# Patient Record
Sex: MALE | Race: WHITE | Employment: FULL TIME
[De-identification: names, ages, dates, MRNs, and addresses within clinical notes are randomized per-mention and may not be internally consistent; named-entity substitution may affect disease eponyms.]

---

## 2020-10-05 ENCOUNTER — NURSE TRIAGE (OUTPATIENT)
Dept: OTHER | Facility: CLINIC | Age: 65
End: 2020-10-05

## 2020-10-05 ENCOUNTER — HOSPITAL ENCOUNTER (INPATIENT)
Age: 65
LOS: 2 days | Discharge: ANOTHER HOSPITAL | DRG: 280 | End: 2020-10-07
Attending: EMERGENCY MEDICINE | Admitting: INTERNAL MEDICINE
Payer: MEDICARE

## 2020-10-05 ENCOUNTER — APPOINTMENT (OUTPATIENT)
Dept: GENERAL RADIOLOGY | Age: 65
DRG: 280 | End: 2020-10-05
Payer: MEDICARE

## 2020-10-05 ENCOUNTER — OFFICE VISIT (OUTPATIENT)
Dept: FAMILY MEDICINE CLINIC | Age: 65
End: 2020-10-05
Payer: MEDICARE

## 2020-10-05 VITALS
BODY MASS INDEX: 37.18 KG/M2 | OXYGEN SATURATION: 99 % | DIASTOLIC BLOOD PRESSURE: 140 MMHG | HEART RATE: 84 BPM | HEIGHT: 69 IN | SYSTOLIC BLOOD PRESSURE: 140 MMHG | WEIGHT: 251 LBS

## 2020-10-05 DIAGNOSIS — I48.91 ATRIAL FIBRILLATION WITH RVR (HCC): Primary | ICD-10-CM

## 2020-10-05 PROBLEM — R07.89 ATYPICAL CHEST PAIN: Status: ACTIVE | Noted: 2020-10-05

## 2020-10-05 PROBLEM — D49.4 NEOPLASM OF BLADDER: Status: ACTIVE | Noted: 2020-10-05

## 2020-10-05 PROBLEM — R00.2 PALPITATIONS: Status: ACTIVE | Noted: 2020-10-05

## 2020-10-05 PROBLEM — I49.9 IRREGULAR HEARTBEAT: Status: ACTIVE | Noted: 2020-10-05

## 2020-10-05 PROBLEM — E78.5 DYSLIPIDEMIA: Status: ACTIVE | Noted: 2020-10-05

## 2020-10-05 PROBLEM — F17.200 HAS BEEN SMOKING TOBACCO FOR 30 YEARS OR MORE: Status: ACTIVE | Noted: 2020-10-05

## 2020-10-05 PROBLEM — E29.1 TESTICULAR HYPOFUNCTION: Status: ACTIVE | Noted: 2020-10-05

## 2020-10-05 PROBLEM — I10 ESSENTIAL HYPERTENSION: Status: ACTIVE | Noted: 2020-10-05

## 2020-10-05 LAB
ABSOLUTE EOS #: 0.2 K/UL (ref 0–0.4)
ABSOLUTE IMMATURE GRANULOCYTE: ABNORMAL K/UL (ref 0–0.3)
ABSOLUTE LYMPH #: 1.3 K/UL (ref 1–4.8)
ABSOLUTE MONO #: 0.6 K/UL (ref 0.1–1.3)
ALBUMIN SERPL-MCNC: 4.1 G/DL (ref 3.5–5.2)
ALBUMIN/GLOBULIN RATIO: NORMAL (ref 1–2.5)
ALP BLD-CCNC: 125 U/L (ref 40–129)
ALT SERPL-CCNC: 30 U/L (ref 5–41)
ANION GAP SERPL CALCULATED.3IONS-SCNC: 12 MMOL/L (ref 9–17)
AST SERPL-CCNC: 37 U/L
BASOPHILS # BLD: 0 % (ref 0–2)
BASOPHILS ABSOLUTE: 0 K/UL (ref 0–0.2)
BILIRUB SERPL-MCNC: 0.85 MG/DL (ref 0.3–1.2)
BNP INTERPRETATION: ABNORMAL
BUN BLDV-MCNC: 19 MG/DL (ref 8–23)
BUN/CREAT BLD: NORMAL (ref 9–20)
C-REACTIVE PROTEIN: 1.3 MG/L (ref 0–5)
CALCIUM SERPL-MCNC: 9.2 MG/DL (ref 8.6–10.4)
CHLORIDE BLD-SCNC: 102 MMOL/L (ref 98–107)
CHOLESTEROL/HDL RATIO: 2.9
CHOLESTEROL: 194 MG/DL
CO2: 23 MMOL/L (ref 20–31)
CREAT SERPL-MCNC: 0.84 MG/DL (ref 0.7–1.2)
D-DIMER QUANTITATIVE: 0.5 MG/L FEU (ref 0–0.59)
DIFFERENTIAL TYPE: ABNORMAL
EOSINOPHILS RELATIVE PERCENT: 2 % (ref 0–4)
GFR AFRICAN AMERICAN: >60 ML/MIN
GFR NON-AFRICAN AMERICAN: >60 ML/MIN
GFR SERPL CREATININE-BSD FRML MDRD: NORMAL ML/MIN/{1.73_M2}
GFR SERPL CREATININE-BSD FRML MDRD: NORMAL ML/MIN/{1.73_M2}
GLUCOSE BLD-MCNC: 98 MG/DL (ref 70–99)
HCT VFR BLD CALC: 42.3 % (ref 41–53)
HDLC SERPL-MCNC: 68 MG/DL
HEMOGLOBIN: 14.5 G/DL (ref 13.5–17.5)
IMMATURE GRANULOCYTES: ABNORMAL %
INR BLD: 1.1
LDL CHOLESTEROL: 113 MG/DL (ref 0–130)
LYMPHOCYTES # BLD: 19 % (ref 24–44)
MAGNESIUM: 2.1 MG/DL (ref 1.6–2.6)
MCH RBC QN AUTO: 33.8 PG (ref 26–34)
MCHC RBC AUTO-ENTMCNC: 34.4 G/DL (ref 31–37)
MCV RBC AUTO: 98.3 FL (ref 80–100)
MONOCYTES # BLD: 9 % (ref 1–7)
NRBC AUTOMATED: ABNORMAL PER 100 WBC
PARTIAL THROMBOPLASTIN TIME: 29.9 SEC (ref 24–36)
PDW BLD-RTO: 13.5 % (ref 11.5–14.9)
PHOSPHORUS: 3.6 MG/DL (ref 2.5–4.5)
PLATELET # BLD: 163 K/UL (ref 150–450)
PLATELET ESTIMATE: ABNORMAL
PMV BLD AUTO: 8.4 FL (ref 6–12)
POTASSIUM SERPL-SCNC: 4.2 MMOL/L (ref 3.7–5.3)
PRO-BNP: 2712 PG/ML
PROTHROMBIN TIME: 13.9 SEC (ref 11.8–14.6)
RBC # BLD: 4.3 M/UL (ref 4.5–5.9)
RBC # BLD: ABNORMAL 10*6/UL
SEG NEUTROPHILS: 70 % (ref 36–66)
SEGMENTED NEUTROPHILS ABSOLUTE COUNT: 4.7 K/UL (ref 1.3–9.1)
SODIUM BLD-SCNC: 137 MMOL/L (ref 135–144)
TOTAL PROTEIN: 6.5 G/DL (ref 6.4–8.3)
TRIGL SERPL-MCNC: 64 MG/DL
TROPONIN INTERP: ABNORMAL
TROPONIN T: ABNORMAL NG/ML
TROPONIN, HIGH SENSITIVITY: 38 NG/L (ref 0–22)
TSH SERPL DL<=0.05 MIU/L-ACNC: 3.07 MIU/L (ref 0.3–5)
VLDLC SERPL CALC-MCNC: NORMAL MG/DL (ref 1–30)
WBC # BLD: 6.8 K/UL (ref 3.5–11)
WBC # BLD: ABNORMAL 10*3/UL

## 2020-10-05 PROCEDURE — 86140 C-REACTIVE PROTEIN: CPT

## 2020-10-05 PROCEDURE — 83735 ASSAY OF MAGNESIUM: CPT

## 2020-10-05 PROCEDURE — 3017F COLORECTAL CA SCREEN DOC REV: CPT | Performed by: FAMILY MEDICINE

## 2020-10-05 PROCEDURE — 1036F TOBACCO NON-USER: CPT | Performed by: FAMILY MEDICINE

## 2020-10-05 PROCEDURE — G8427 DOCREV CUR MEDS BY ELIG CLIN: HCPCS | Performed by: FAMILY MEDICINE

## 2020-10-05 PROCEDURE — 6360000002 HC RX W HCPCS: Performed by: EMERGENCY MEDICINE

## 2020-10-05 PROCEDURE — 85025 COMPLETE CBC W/AUTO DIFF WBC: CPT

## 2020-10-05 PROCEDURE — 99283 EMERGENCY DEPT VISIT LOW MDM: CPT

## 2020-10-05 PROCEDURE — 96365 THER/PROPH/DIAG IV INF INIT: CPT

## 2020-10-05 PROCEDURE — 99284 EMERGENCY DEPT VISIT MOD MDM: CPT

## 2020-10-05 PROCEDURE — 6360000002 HC RX W HCPCS: Performed by: STUDENT IN AN ORGANIZED HEALTH CARE EDUCATION/TRAINING PROGRAM

## 2020-10-05 PROCEDURE — G8484 FLU IMMUNIZE NO ADMIN: HCPCS | Performed by: FAMILY MEDICINE

## 2020-10-05 PROCEDURE — 85730 THROMBOPLASTIN TIME PARTIAL: CPT

## 2020-10-05 PROCEDURE — 1123F ACP DISCUSS/DSCN MKR DOCD: CPT | Performed by: FAMILY MEDICINE

## 2020-10-05 PROCEDURE — 84443 ASSAY THYROID STIM HORMONE: CPT

## 2020-10-05 PROCEDURE — 36415 COLL VENOUS BLD VENIPUNCTURE: CPT

## 2020-10-05 PROCEDURE — 84100 ASSAY OF PHOSPHORUS: CPT

## 2020-10-05 PROCEDURE — 83036 HEMOGLOBIN GLYCOSYLATED A1C: CPT

## 2020-10-05 PROCEDURE — 80053 COMPREHEN METABOLIC PANEL: CPT

## 2020-10-05 PROCEDURE — 2500000003 HC RX 250 WO HCPCS: Performed by: EMERGENCY MEDICINE

## 2020-10-05 PROCEDURE — 2060000000 HC ICU INTERMEDIATE R&B

## 2020-10-05 PROCEDURE — 80061 LIPID PANEL: CPT

## 2020-10-05 PROCEDURE — 99223 1ST HOSP IP/OBS HIGH 75: CPT | Performed by: INTERNAL MEDICINE

## 2020-10-05 PROCEDURE — 96376 TX/PRO/DX INJ SAME DRUG ADON: CPT

## 2020-10-05 PROCEDURE — 6370000000 HC RX 637 (ALT 250 FOR IP): Performed by: EMERGENCY MEDICINE

## 2020-10-05 PROCEDURE — 85379 FIBRIN DEGRADATION QUANT: CPT

## 2020-10-05 PROCEDURE — 83880 ASSAY OF NATRIURETIC PEPTIDE: CPT

## 2020-10-05 PROCEDURE — 93005 ELECTROCARDIOGRAM TRACING: CPT | Performed by: EMERGENCY MEDICINE

## 2020-10-05 PROCEDURE — 84484 ASSAY OF TROPONIN QUANT: CPT

## 2020-10-05 PROCEDURE — 85610 PROTHROMBIN TIME: CPT

## 2020-10-05 PROCEDURE — 2580000003 HC RX 258: Performed by: EMERGENCY MEDICINE

## 2020-10-05 PROCEDURE — G8417 CALC BMI ABV UP PARAM F/U: HCPCS | Performed by: FAMILY MEDICINE

## 2020-10-05 PROCEDURE — 4040F PNEUMOC VAC/ADMIN/RCVD: CPT | Performed by: FAMILY MEDICINE

## 2020-10-05 PROCEDURE — 71045 X-RAY EXAM CHEST 1 VIEW: CPT

## 2020-10-05 PROCEDURE — 99204 OFFICE O/P NEW MOD 45 MIN: CPT | Performed by: FAMILY MEDICINE

## 2020-10-05 RX ORDER — SODIUM CHLORIDE 0.9 % (FLUSH) 0.9 %
10 SYRINGE (ML) INJECTION EVERY 12 HOURS SCHEDULED
Status: DISCONTINUED | OUTPATIENT
Start: 2020-10-05 | End: 2020-10-07 | Stop reason: HOSPADM

## 2020-10-05 RX ORDER — ASPIRIN 81 MG/1
TABLET, CHEWABLE ORAL
Status: DISCONTINUED
Start: 2020-10-05 | End: 2020-10-05 | Stop reason: WASHOUT

## 2020-10-05 RX ORDER — ASPIRIN 81 MG/1
324 TABLET, CHEWABLE ORAL ONCE
Status: COMPLETED | OUTPATIENT
Start: 2020-10-05 | End: 2020-10-05

## 2020-10-05 RX ORDER — SODIUM CHLORIDE 0.9 % (FLUSH) 0.9 %
10 SYRINGE (ML) INJECTION PRN
Status: DISCONTINUED | OUTPATIENT
Start: 2020-10-05 | End: 2020-10-07 | Stop reason: HOSPADM

## 2020-10-05 RX ORDER — M-VIT,TX,IRON,MINS/CALC/FOLIC 27MG-0.4MG
1 TABLET ORAL DAILY
COMMUNITY

## 2020-10-05 RX ORDER — DILTIAZEM HYDROCHLORIDE 5 MG/ML
10 INJECTION INTRAVENOUS ONCE
Status: COMPLETED | OUTPATIENT
Start: 2020-10-05 | End: 2020-10-05

## 2020-10-05 RX ORDER — ACETAMINOPHEN 650 MG/1
650 SUPPOSITORY RECTAL EVERY 6 HOURS PRN
Status: DISCONTINUED | OUTPATIENT
Start: 2020-10-05 | End: 2020-10-07 | Stop reason: HOSPADM

## 2020-10-05 RX ORDER — ACETAMINOPHEN 325 MG/1
650 TABLET ORAL EVERY 6 HOURS PRN
Status: DISCONTINUED | OUTPATIENT
Start: 2020-10-05 | End: 2020-10-07 | Stop reason: HOSPADM

## 2020-10-05 RX ORDER — NAPROXEN SODIUM 220 MG
220 TABLET ORAL 2 TIMES DAILY WITH MEALS
Status: ON HOLD | COMMUNITY
End: 2020-10-08 | Stop reason: HOSPADM

## 2020-10-05 RX ORDER — ONDANSETRON 2 MG/ML
4 INJECTION INTRAMUSCULAR; INTRAVENOUS EVERY 6 HOURS PRN
Status: DISCONTINUED | OUTPATIENT
Start: 2020-10-05 | End: 2020-10-07 | Stop reason: HOSPADM

## 2020-10-05 RX ORDER — M-VIT,TX,IRON,MINS/CALC/FOLIC 27MG-0.4MG
1 TABLET ORAL DAILY
Status: DISCONTINUED | OUTPATIENT
Start: 2020-10-05 | End: 2020-10-07 | Stop reason: HOSPADM

## 2020-10-05 RX ORDER — PROMETHAZINE HYDROCHLORIDE 25 MG/1
12.5 TABLET ORAL EVERY 6 HOURS PRN
Status: DISCONTINUED | OUTPATIENT
Start: 2020-10-05 | End: 2020-10-07 | Stop reason: HOSPADM

## 2020-10-05 RX ORDER — POLYETHYLENE GLYCOL 3350 17 G/17G
17 POWDER, FOR SOLUTION ORAL DAILY PRN
Status: DISCONTINUED | OUTPATIENT
Start: 2020-10-05 | End: 2020-10-07 | Stop reason: HOSPADM

## 2020-10-05 RX ORDER — HEPARIN SODIUM 10000 [USP'U]/100ML
12 INJECTION, SOLUTION INTRAVENOUS CONTINUOUS
Status: DISCONTINUED | OUTPATIENT
Start: 2020-10-05 | End: 2020-10-07 | Stop reason: HOSPADM

## 2020-10-05 RX ADMIN — DILTIAZEM HYDROCHLORIDE 5 MG/HR: 5 INJECTION INTRAVENOUS at 16:35

## 2020-10-05 RX ADMIN — DILTIAZEM HYDROCHLORIDE 10 MG: 5 INJECTION, SOLUTION INTRAVENOUS at 16:32

## 2020-10-05 RX ADMIN — ENOXAPARIN SODIUM 120 MG: 120 INJECTION SUBCUTANEOUS at 18:09

## 2020-10-05 RX ADMIN — HEPARIN SODIUM AND DEXTROSE 12 UNITS/KG/HR: 10000; 5 INJECTION INTRAVENOUS at 21:40

## 2020-10-05 RX ADMIN — ASPIRIN 324 MG: 81 TABLET, CHEWABLE ORAL at 17:27

## 2020-10-05 ASSESSMENT — ENCOUNTER SYMPTOMS
VOMITING: 0
SORE THROAT: 0
WHEEZING: 0
SHORTNESS OF BREATH: 1
BACK PAIN: 0
COUGH: 0
ABDOMINAL PAIN: 0
CONSTIPATION: 0
BLOOD IN STOOL: 0
COUGH: 0
SHORTNESS OF BREATH: 1
ABDOMINAL PAIN: 0
TROUBLE SWALLOWING: 0
NAUSEA: 0
COLOR CHANGE: 0
DIARRHEA: 0
CHEST TIGHTNESS: 1

## 2020-10-05 ASSESSMENT — PATIENT HEALTH QUESTIONNAIRE - PHQ9
SUM OF ALL RESPONSES TO PHQ QUESTIONS 1-9: 0
9. THOUGHTS THAT YOU WOULD BE BETTER OFF DEAD, OR OF HURTING YOURSELF: 0
2. FEELING DOWN, DEPRESSED OR HOPELESS: 0
1. LITTLE INTEREST OR PLEASURE IN DOING THINGS: 0
SUM OF ALL RESPONSES TO PHQ QUESTIONS 1-9: 0
10. IF YOU CHECKED OFF ANY PROBLEMS, HOW DIFFICULT HAVE THESE PROBLEMS MADE IT FOR YOU TO DO YOUR WORK, TAKE CARE OF THINGS AT HOME, OR GET ALONG WITH OTHER PEOPLE: 0
SUM OF ALL RESPONSES TO PHQ9 QUESTIONS 1 & 2: 0

## 2020-10-05 NOTE — PROGRESS NOTES
Medication History completed:    New medications: multivitamin, naproxen    Medications discontinued: none    Changes to dosing: none    Stated allergies: NKDA    Other pertinent information: Medications confirmed with patient. He denies taking prescription medications. Thank you,  Charline RibeiroD, BCPS  [phone]    This medication history was completed by student Mino Ortiz, CharlineD Candidate [id number] under my supervision.

## 2020-10-05 NOTE — H&P
[address]      [address] [facility]     HISTORY AND PHYSICAL EXAMINATION            Date:   10/5/2020  Patient name:  Juan Banerjee  Date of admission:  10/5/2020  3:37 PM  MRN:   [id number]  Account:  [de-identified]  YOB: 1955  PCP:    ANGELITO Ward CNP  Room:   2087/2087-01  Code Status:    Full Code    Chief Complaint:     Chief Complaint   Patient presents with    Palpitations    Shortness of Breath    Hypertension       History Obtained From:     patient, electronic medical record    History of Present Illness: The patient is a 77 y.o. Non-/non  male who presents withPalpitations; Shortness of Breath; and Hypertension    and he is admitted to the hospital for the management of new onset atrial fibrillation as seen on EKG    His history dates back to 2-3 weeks ago when he started to experience all of a sudden an intermittent palpitations that first occurred when he was sleeping and woke him up at night associated with shortness of breath, patient felt palpitations as if his chest was pounding with no real chest pain, no cough no fever no sputum production. This chest palpitation is intermittent occurring about 3 times per week for the last week. Patient mentioned that he made an appointment with his PCP to address these palpitations and was told to come to the ED, he denied any prior cardiac history and having and any cardiac workup done. Patient is non-smoker and drinks occasionally on weekends and denied any heavy alcohol consumption lately. No abdominal pain, no nausea, no vomiting, no change in bowel habits, no urinary symptoms and no lower limb swelling. Patient is otherwise asymptomatic only complaining of palpitations and associated shortness of breath in the absence of other pulmonary and cardiac symptoms.      Past Medical History:     History reviewed. No pertinent past medical history. Past SurgicalHistory:     History reviewed. No pertinent surgical history. Medications Prior to Admission:        Prior to Admission medications    Medication Sig Start Date End Date Taking? Authorizing Provider   Multiple Vitamins-Minerals (THERAPEUTIC MULTIVITAMIN-MINERALS) tablet Take 1 tablet by mouth daily   Yes Historical Provider, MD   naproxen sodium (ANAPROX) 220 MG tablet Take 220 mg by mouth 2 times daily (with meals)   Yes Historical Provider, MD        Allergies:     No known allergies    Social History:     Tobacco:    reports that he quit smoking about 45 years ago. His smoking use included cigarettes. He has a 15.00 pack-year smoking history. He has never used smokeless tobacco.  Alcohol:      reports previous alcohol use. Drug Use:  reports no history of drug use. Family History:     Family History   Problem Relation Age of Onset    Cancer Father         sinus cancer        Review of Systems:     Positive and Negative as described in HPI. CONSTITUTIONAL:  no fevers  EYES: negative for blury vision  HEENT: No headaches, no nasal congestion, no difficulty swallowing  RESPIRATORY:negative for dyspnea, no wheezing, no Cough  CARDIOVASCULAR: negative for chest pain, no palpitations  GASTROINTESTINAL: no nausea, no vomiting, no change in bowel habits, no abdominal pain   GENITOURINARY: negative for dysuria, no hematuria   MUSCULOSKELETAL: no joint pains, no muscle aches, no swelling of joints or extremities  NEUROLOGICAL: No weakness or numbness      Physical Exam:   BP (!) 148/97   Pulse 66   Temp 98.5 °F (36.9 °C) (Oral)   Resp 18   Ht 5' 9\" (1.753 m)   Wt 244 lb 7.8 oz (110.9 kg)   SpO2 97%   BMI 36.10 kg/m²   Temp (24hrs), Av.4 °F (36.9 °C), Min:98.3 °F (36.8 °C), Max:98.5 °F (36.9 °C)        No results for input(s): POCGLU in the last 72 hours.   No intake or output data in the 24 hours ending 10/05/20 8419    PHYSICAL EXAM:  General Appearance  Alert , awake, not in acute distress  HEENT - Head is normocephalic, atraumatic. Lungs - Bilateral equal air entry, no wheezes, rales or rhonchi, aeration good  Cardiovascular - Heart sounds are normal.  Regular rhythm, normal rate without murmur, gallop or rub.   Abdomen - Soft, nontender, nondistended, no masses or organomegaly  Neurologic - There are no new focal motor or sensory deficits  Skin - No bruising or bleeding on exposed skin area  Extremities - No cyanosis, clubbing or edema      Investigations:     Laboratory Testing:  Recent Results (from the past 24 hour(s))   Troponin    Collection Time: 10/05/20  4:05 PM   Result Value Ref Range    Troponin, High Sensitivity 38 (H) 0 - 22 ng/L    Troponin T NOT REPORTED <0.03 ng/mL    Troponin Interp NOT REPORTED    CBC Auto Differential    Collection Time: 10/05/20  4:05 PM   Result Value Ref Range    WBC 6.8 3.5 - 11.0 k/uL    RBC 4.30 (L) 4.5 - 5.9 m/uL    Hemoglobin 14.5 13.5 - 17.5 g/dL    Hematocrit 42.3 41 - 53 %    MCV 98.3 80 - 100 fL    MCH 33.8 26 - 34 pg    MCHC 34.4 31 - 37 g/dL    RDW 13.5 11.5 - 14.9 %    Platelets 081 890 - 144 k/uL    MPV 8.4 6.0 - 12.0 fL    NRBC Automated NOT REPORTED per 100 WBC    Differential Type NOT REPORTED     Seg Neutrophils 70 (H) 36 - 66 %    Lymphocytes 19 (L) 24 - 44 %    Monocytes 9 (H) 1 - 7 %    Eosinophils % 2 0 - 4 %    Basophils 0 0 - 2 %    Immature Granulocytes NOT REPORTED 0 %    Segs Absolute 4.70 1.3 - 9.1 k/uL    Absolute Lymph # 1.30 1.0 - 4.8 k/uL    Absolute Mono # 0.60 0.1 - 1.3 k/uL    Absolute Eos # 0.20 0.0 - 0.4 k/uL    Basophils Absolute 0.00 0.0 - 0.2 k/uL    Absolute Immature Granulocyte NOT REPORTED 0.00 - 0.30 k/uL    WBC Morphology NOT REPORTED     RBC Morphology NOT REPORTED     Platelet Estimate NOT REPORTED    Comprehensive Metabolic Panel    Collection Time: 10/05/20  4:05 PM   Result Value Ref Range    Glucose 98 70 - 99 mg/dL    BUN 19 8 - 23 mg/dL    CREATININE 0.84 0.70 - 1.20 mg/dL    Bun/Cre Ratio NOT REPORTED 9 - 20    Calcium 9.2 8.6 - 10.4 mg/dL    Sodium 137 135 - 144 mmol/L    Potassium 4.2 3.7 - 5.3 mmol/L    Chloride 102 98 - 107 mmol/L    CO2 23 20 - 31 mmol/L    Anion Gap 12 9 - 17 mmol/L    Alkaline Phosphatase 125 40 - 129 U/L    ALT 30 5 - 41 U/L    AST 37 <40 U/L    Total Bilirubin 0.85 0.3 - 1.2 mg/dL    Total Protein 6.5 6.4 - 8.3 g/dL    Alb 4.1 3.5 - 5.2 g/dL    Albumin/Globulin Ratio NOT REPORTED 1.0 - 2.5    GFR Non-African American >60 >60 mL/min    GFR African American >60 >60 mL/min    GFR Comment          GFR Staging NOT REPORTED    Brain Natriuretic Peptide    Collection Time: 10/05/20  4:05 PM   Result Value Ref Range    Pro-BNP 2,712 (H) <300 pg/mL    BNP Interpretation Pro-BNP Reference Range:    Protime-INR    Collection Time: 10/05/20  4:05 PM   Result Value Ref Range    Protime 13.9 11.8 - 14.6 sec    INR 1.1    APTT    Collection Time: 10/05/20  4:05 PM   Result Value Ref Range    PTT 29.9 24.0 - 36.0 sec   D-Dimer, Quantitative    Collection Time: 10/05/20  4:05 PM   Result Value Ref Range    D-Dimer, Quant 0.50 0.00 - 0.59 mg/L FEU   C-Reactive Protein    Collection Time: 10/05/20  4:05 PM   Result Value Ref Range    CRP 1.3 0.0 - 5.0 mg/L   TSH with Reflex    Collection Time: 10/05/20  4:09 PM   Result Value Ref Range    TSH 3.07 0.30 - 5.00 mIU/L   Lipid Profile    Collection Time: 10/05/20  4:09 PM   Result Value Ref Range    Cholesterol 194 <200 mg/dL    HDL 68 >40 mg/dL    LDL Cholesterol 113 0 - 130 mg/dL    Chol/HDL Ratio 2.9 <5    Triglycerides 64 <150 mg/dL    VLDL NOT REPORTED 1 - 30 mg/dL   Magnesium    Collection Time: 10/05/20  4:09 PM   Result Value Ref Range    Magnesium 2.1 1.6 - 2.6 mg/dL   PHOSPHORUS    Collection Time: 10/05/20  4:09 PM   Result Value Ref Range    Phosphorus 3.6 2.5 - 4.5 mg/dL         Current Facility-Administered Medications   Medication Dose Route Frequency Provider Last Rate Factors  Component Value   C CHF No 0   H HTN Yes 1   A2 Age >= 76 No,  (76 y.o.) 0   D DM No 0   S2 Prior Stroke/TIA No 0   V Vascular Disease No 0   A Age 74-69 Yes,  (76 y.o.) 1   Sc Sex male 0    [id number]  Score  2   Score last updated 10/5/20 9:39 PM EDT    Click here for a link to the UpToDate guideline \"Atrial Fibrillation: Anticoagulation therapy to prevent embolization    Disclaimer: Risk Score calculation is dependent on accuracy of patient problem list and past encounter diagnosis. Newly diagnosed Afib with RVR, workup:   - Chronic Afib   - R/O Pulmonary causes; order CXR, (unremarkable)  - R/O PE, order D-Dimer; came back negative   - R/O myocardial ischemia: Order Troponin (38), start ASA 81 mg and 324 mg loading was given   - R/O CHF with Echo, Pro-BNP 2712.  - Consult Cardiology   - R/O Structural heart / valvular heart disease or atrial myxoma with cardiac Echo  - R/O Anemia: order CBC (hemoglobin 14.5)  - R/O hyperthyroidism: order TSH  - Possible alcoholism   - R/O sleep apnea: patient may need sleep study  - Check electrolytes with CMP; check potasium, calcium, magnesium   - HbA1c ordered   - Lipid panel     Rate, rhythm control of Afib and anticoagulation:  - Diltiazem 10 mg IV stat givne   - Diltiazem 125 mg infusion  - Heparin infusion  - Patient may need oral anticoagulation for stroke risk reduction later on. His score is 2 on [id number] score. Hypertension, for workup:  - Urine analysis   - Renal function test   - check if to order abdominal scan to look for adrenal glands and kidneys as well as renal arteries and R/O pheochromocytoma. - Check renin and aldosterone levels     Patient is admitted as inpatient status because of co-morbiditieslisted above, severity of signs and symptoms as outlined, requirement for current medical therapies and most importantly because of direct risk to patient if care not provided in a hospital setting.     Dread Ellis MD  10/5/2020  9:08 PM    Copy sent to ANGELITO Noel - CNP  Attending Physician Statement  I have discussed the care of Geeta Manjarrez and I have examined the patient myselft and taken ros and hpi , including pertinent history and exam findings,  with the resident. I have reviewed the key elements of all parts of the encounter with the resident. I agree with the assessment, plan and orders as documented by the resident.       Electronically signed by Juani Stone MD

## 2020-10-05 NOTE — ED PROVIDER NOTES
[address] ED  eMERGENCY dEPARTMENT eNCOUnter    Pt Name: Gurwinder Schwartz  MRN: [id number]  [place]: 1955  Date of evaluation:10/5/20  PCP: ANGELITO Stockton CNP    CHIEF COMPLAINT       Chief Complaint   Patient presents with    Palpitations    Shortness of Breath    Hypertension       HISTORY OF PRESENT ILLNESS    Gurwinder Schwartz is a 77 y.o. male who presents with a chief complaint of several weeks of palpitations, shortness of breath and high blood pressure. Symptoms have been ongoing for least the past 3 weeks. He actually made an appoint with a new PCP today and was told to come to the emergency department. He is not currently on medications for high blood pressure, atrial fibrillation. Has never officially been diagnosed with high blood pressure or atrial fibrillation. He denies any chest pain right now. He is just feeling palpitations. Denies any shortness of breath right now. No recent fevers, chills other illnesses. No cardiac history that he knows of. Symptoms are acute. Symptoms are moderate. Nothing else make symptoms better or worse. Patient has no other complaints at this time. REVIEW OF SYSTEMS       Review of Systems   Constitutional: Negative for chills, fatigue and fever. HENT: Negative for congestion, ear pain, sore throat and trouble swallowing. Eyes: Negative for visual disturbance. Respiratory: Positive for shortness of breath. Negative for cough. Cardiovascular: Positive for palpitations. Negative for chest pain and leg swelling. Gastrointestinal: Negative for abdominal pain, blood in stool, constipation, diarrhea, nausea and vomiting. Genitourinary: Negative for dysuria and flank pain. Musculoskeletal: Negative for arthralgias, back pain, myalgias and neck pain. Skin: Negative for color change, rash and wound. Neurological: Negative for dizziness, weakness, light-headedness, numbness and headaches.    Psychiatric/Behavioral: Negative for confusion. All other systems reviewed and are negative. Negativein 10 essential Systems except as mentioned above and in the HPI. PAST MEDICAL HISTORY   History reviewed. No pertinent past medical history. None    SURGICAL HISTORY      has no past surgical history on file. None    CURRENT MEDICATIONS       Previous Medications    MULTIPLE VITAMINS-MINERALS (THERAPEUTIC MULTIVITAMIN-MINERALS) TABLET    Take 1 tablet by mouth daily    NAPROXEN SODIUM (ANAPROX) 220 MG TABLET    Take 220 mg by mouth 2 times daily (with meals)       ALLERGIES     is allergic to no known allergies. FAMILY HISTORY     He indicated that his mother is . He indicated that his father is . family history includes Cancer in his father. SOCIAL HISTORY      reports that he quit smoking about 45 years ago. His smoking use included cigarettes. He has a 15.00 pack-year smoking history. He has never used smokeless tobacco. He reports previous alcohol use. He reports that he does not use drugs. PHYSICAL EXAM     INITIAL VITALS:  oral temperature is 98.3 °F (36.8 °C). His blood pressure is 136/95 (abnormal) and his pulse is 92. His respiration is 21 and oxygen saturation is 99%. Physical Exam  Vitals signs and nursing note reviewed. Constitutional:       General: He is not in acute distress. HENT:      Head: Normocephalic and atraumatic. Eyes:      Conjunctiva/sclera: Conjunctivae normal.      Pupils: Pupils are equal, round, and reactive to light. Neck:      Musculoskeletal: Neck supple. Cardiovascular:      Rate and Rhythm: Regular rhythm. Tachycardia present. Heart sounds: Normal heart sounds. No murmur. No friction rub. Pulmonary:      Effort: Pulmonary effort is normal. No respiratory distress. Breath sounds: Normal breath sounds. Abdominal:      General: Bowel sounds are normal. There is no distension. Palpations: Abdomen is soft. Tenderness:  There is no abdominal tenderness. Musculoskeletal:         General: No tenderness. Lymphadenopathy:      Cervical: No cervical adenopathy. Skin:     General: Skin is warm and dry. Findings: No rash. Neurological:      Mental Status: He is alert and oriented to person, place, and time. Psychiatric:         Judgment: Judgment normal.           DIFFERENTIAL DIAGNOSIS/MDM:   25-year-old male presents from [address] [place] [address] today due to elevated blood pressure and elevated pulse rate. Currently is afebrile, nontoxic, hypertensive and also tachycardic in the 120s. Appears to be in atrial fibrillation with RVR. Has no history of this. We will get broad cardiac work-up. Will start patient on diltiazem. Patient likely will need hospital admission. DIAGNOSTIC RESULTS     EKG: All EKG's are interpreted by the Emergency Department Physician who either signs or Co-signs this chart in the absence of a cardiologist.    EKG Interpretation    Interpreted by emergency department physician at 3:58 PM EDT.     Rhythm: atrial fibrillation - rapid  Rate: tachycardia and 104  Axis: normal  Ectopy: none  Conduction: normal  ST Segments: nonspecific changes  T Waves: non specific changes  Q Waves: nonspecific    EKG  Impression: non-specific EKG and atrial fibrillation (new onset) with rapid ventricular response       RADIOLOGY:   I directly visualized the following  images and reviewed the radiologist interpretations:  XR CHEST PORTABLE   Final Result   No acute findings                 ED BEDSIDE ULTRASOUND:      LABS:  Labs Reviewed   TROPONIN - Abnormal; Notable for the following components:       Result Value    Troponin, High Sensitivity 38 (*)     All other components within normal limits   CBC WITH AUTO DIFFERENTIAL - Abnormal; Notable for the following components:    RBC 4.30 (*)     Seg Neutrophils 70 (*)     Lymphocytes 19 (*)     Monocytes 9 (*)     All other components within normal limits   COMPREHENSIVE METABOLIC PANEL PROTIME-INR   APTT   D-DIMER, QUANTITATIVE   TROPONIN   BRAIN NATRIURETIC PEPTIDE   C-REACTIVE PROTEIN         EMERGENCY DEPARTMENT COURSE:   Vitals:    Vitals:    10/05/20 1630 10/05/20 1645 10/05/20 1700 10/05/20 1715   BP: (!) 151/101 (!) 127/91 (!) 129/101 (!) 136/95   Pulse: 99 82 89 92   Resp: 17 14 16 21   Temp:       TempSrc:       SpO2: 99% 98% 97% 99%     Age-adjusted d-dimer is normal.  Initial troponin is slightly elevated however could be secondary to demand ischemia. Not having any chest pain. No ischemic changes on EKG. I spoke with Dr. Cosme Oconnor who accepted patient for admission. Spoke with residents will place admission orders. Patient was given full dose aspirin, Lovenox, still on Cardizem drip. CRITICALCARE:      CONSULTS:  IP CONSULT TO INTERNAL MEDICINE      PROCEDURES:      FINAL IMPRESSION      1. Atrial fibrillation with RVR ([address]            DISPOSITION/PLAN   DISPOSITION Decision To Admit 10/05/2020 05:06:35 PM        PATIENT REFERRED TO:  No follow-up provider specified.     DISCHARGE MEDICATIONS:  New Prescriptions    No medications on file       (Please note that portions ofthis note were completed with a voice recognition program.  Efforts were made to edit the dictations but occasionally words are mis-transcribed.)    Jackie Anne DO  Attending Emergency Physician          Jackie Anne DO  10/05/20 1726

## 2020-10-05 NOTE — ED NOTES
Admission Dx: A-fib with RVR    Pts Chief Complaints on Arrival: High blood pressure    ADL's - Self care    Pending Diagnostics:  None    Residence PTA: Private residence    Special Considerations/Circumstances:  none    Vitals: Current vital signs:  BP (!) 136/95   Pulse 92   Temp 98.3 °F (36.8 °C) (Oral)   Resp 21   Ht 5' 9\" (1.753 m)   Wt 251 lb (113.9 kg)   SpO2 99%   BMI 37.07 kg/m²                MEWS Score: 2          Barbie Allen, RN  10/05/20 9176

## 2020-10-05 NOTE — PROGRESS NOTES
[facility]  [facility]  [address]  [address] [address]  Dept: [phone]     10/5/2020   Chief Complaint   Patient presents with    New Patient    Shortness of Breath    Hypertension     no history of it      HPI  Elizabeth Mnotez (:  1955) is a 77 y.o. male is new to the office. Patient is a poor historian. Patient has not been to his primary care for over 6 years. Patient denies any chronic conditions. He is not taking any medication on a regular basis. Patient arrived with elevated blood pressure reading. Patient noted that his blood pressure had always been a little elevated but he was never put on any medication for it. He was complaining of chest pain,  palpitations in the past few weeks. Worse in the past few days. Patient states that he would be woken up from his sleep and had to sit up to catch his breath. .  Palpitations last for over a few minutes. Again, associated with some chest discomfort and some shortness of breath. Patient have not had this problem in the past.    Patient was a heavy smoker. He quit a few years ago. He has over 30 years of smoking history. Patient was also told that his cholesterol levels were elevated. He is never been put on any statin or any medication for this either. Patient is  he has 2 grown children and grandkids. He is currently single. He admits to drinking alcohol on the weekends. 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 He has been working there for a while now. He denies any recreational drug use. Elizabeth Montez is due for annual preventative health screening including annual blood work. We will place the orders for these today. Isrealalexei Neda is due for PSA screening  The natural history of prostate cancer and ongoing controversy regarding screening and potential treatment outcomes of prostate cancer has been discussed with the patient.  The meaning of a false positive PSA and a false negative PSA has been discussed. He indicates understanding of the limitations of this screening test and wishes to proceed with screening PSA testing. He denies frequency, urgency or dysuria, or incomplete bladder emptying. No results found for: PSA, PSADIA    Patient is due for colon cancer screening. Patient recall having had a colonoscopy a long long time ago. Patient denies any polyp noted on his colonoscopy. Mason Akash denies  nausea, vomiting, diarrhea, constipation, blood in the stool or abdominal pain. We discussed options, she would like to have: cologuard. Patient was found with a fast heart rate of . Which is irregularly irregular. This is a new onset. Since our office does not have any cardiac monitor, I have decided to send the patient to the emergency room for further evaluation. Patient is most likely to have atrial fibrillation. Called made to the ER charge nurse Poli Whitney RN. Patient was taken over via wheelchair by ER manager Clementina Moreno. PHQ-9 Total Score: 0 (10/5/2020  3:04 PM)  Thoughts that you would be better off dead, or of hurting yourself in some way: 0 (10/5/2020  3:04 PM)     Counseling given: Not Answered    Patient Active Problem List   Diagnosis    Essential hypertension    Has been smoking tobacco for 30 years or more    Irregular heartbeat    Atypical chest pain    Palpitations    Dyslipidemia    Atrial fibrillation with RVR ([facility] [address]    Neoplasm of bladder    Testicular hypofunction      History reviewed. No pertinent past medical history. History reviewed. No pertinent surgical history. Family History   Problem Relation Age of Onset    Cancer Father         sinus cancer      No current facility-administered medications for this visit.       Current Outpatient Medications   Medication Sig Dispense Refill    Multiple Vitamins-Minerals (THERAPEUTIC MULTIVITAMIN-MINERALS) tablet Take 1 tablet by mouth daily      naproxen sodium (ANAPROX) 220 MG tablet Take 220 mg by mouth 2 times daily (with meals)       Facility-Administered Medications Ordered in Other Visits   Medication Dose Route Frequency Provider Last Rate Last Dose    dilTIAZem 125 mg in dextrose 5 % 125 mL infusion  5 mg/hr Intravenous Continuous Mars Leonard, DO 5 mL/hr at 10/05/20 1635 5 mg/hr at 10/05/20 1635     Review of Systems   Constitutional: Negative for chills, fatigue and fever. HENT: Negative. Respiratory: Positive for chest tightness and shortness of breath. Negative for cough and wheezing. Cardiovascular: Positive for chest pain and palpitations. Gastrointestinal: Negative for abdominal pain. Genitourinary: Negative for dysuria. Musculoskeletal: Negative for arthralgias and myalgias. Skin: Negative for rash. Neurological: Negative for light-headedness and headaches. Hematological: Does not bruise/bleed easily. Psychiatric/Behavioral: Positive for sleep disturbance. The patient is nervous/anxious. Prior to Visit Medications    Medication Sig Taking? Authorizing Provider   Multiple Vitamins-Minerals (THERAPEUTIC MULTIVITAMIN-MINERALS) tablet Take 1 tablet by mouth daily  Historical Provider, MD   naproxen sodium (ANAPROX) 220 MG tablet Take 220 mg by mouth 2 times daily (with meals)  Historical Provider, MD      Social History     Tobacco Use    Smoking status: Former Smoker     Packs/day: 1.00     Years: 15.00     Pack years: 15.00     Types: Cigarettes     Last attempt to quit: 10/5/1975     Years since quittin.0    Smokeless tobacco: Never Used   Substance Use Topics    Alcohol use: Not Currently      Vitals:    10/05/20 0259 10/05/20 1452   BP: (!) 160/90 (!) 140/140   Pulse:  84   SpO2:  99%   Weight:  251 lb (113.9 kg)   Height:  5' 9\" (1.753 m)     Estimated body mass index is 37.07 kg/m² as calculated from the following:    Height as of this encounter: 5' 9\" (1.753 m). Weight as of this encounter: 251 lb (113.9 kg).   Physical CREATININE 0.84 10/05/2020    GLUCOSE 98 10/05/2020    CALCIUM 9.2 10/05/2020      ASSESSMENT/PLAN:  1. Irregular heartbeat  New findings  Evaluate for a-fib    - CBC Auto Differential; Future  - Comprehensive Metabolic Panel, Fasting; Future  - Lipid, Fasting; Future  - TSH without Reflex; Future  - Urinalysis Reflex to Culture; Future  - Vitamin D 25 Hydroxy; Future  - Holter Monitor 24 Hour; Future    2. Atypical chest pain  Worsening  Sent to ED    - CBC Auto Differential; Future  - Comprehensive Metabolic Panel, Fasting; Future    3. Palpitations  New findings  Evaluate for a-fib    - CBC Auto Differential; Future  - Comprehensive Metabolic Panel, Fasting; Future  - TSH without Reflex; Future    4. Essential hypertension  New diagnosis  Need to start treatment    - CBC Auto Differential; Future  - Comprehensive Metabolic Panel, Fasting; Future  - Urinalysis Reflex to Culture; Future    5. Dyslipidemia  Historical  Will repeat levels  - Comprehensive Metabolic Panel, Fasting; Future  - Lipid, Fasting; Future    6. Elevated fasting blood sugar  Historical  No DM diagnosis  - Comprehensive Metabolic Panel, Fasting; Future  - Urinalysis Reflex to Culture; Future  - Hemoglobin A1C; Future    7. Weight gain  Ongoing  No DM  - Comprehensive Metabolic Panel, Fasting; Future  - Lipid, Fasting; Future  - TSH without Reflex; Future  - Hemoglobin A1C; Future    8. Class 2 severe obesity with serious comorbidity and body mass index (BMI) of 37.0 to 37.9 in adult, unspecified obesity type ([address]  BMI increasing  DISCUSSED AND ADVISED TO:  Eat a low-fat and low carbohydrates diet. Avoid fried foods especially fast food. Choose healthier options for snacks. Have 5-6 servings of fruits and vegetables per day. Cut down on eating processed food. Add 30 minutes to 1 hour aerobic exercise for 3-4 days a week. 9. Has been smoking tobacco for 30 years or more  historical    10.  Establishing care with new doctor, encounter for  Initial visit  - CBC Auto Differential; Future  - Comprehensive Metabolic Panel, Fasting; Future  - Lipid, Fasting; Future  - TSH without Reflex; Future  - Urinalysis Reflex to Culture; Future  - Vitamin D 25 Hydroxy; Future  - Hemoglobin A1C; Future    11. Screening for prostate cancer  recommended    - Psa screening; Future    12. Colon cancer screening  recommended    - Cologuard; Future    13. Screening for viral disease  recommended    - Hepatitis C Antibody; Future  - HIV Screen; Future  - Varicella Zoster Antibody, IgG; Future     Controlled Substance Monitoring:  Acute and Chronic Pain Monitoring:   No flowsheet data found. Orders Placed This Encounter   Procedures    Cologuard     This test is performed by an external laboratory and is used for result attachment only. It is required that this order requisition be faxed to: [employer] @@ [phone]. See [email or web address] for further information. Standing Status:   Future     Standing Expiration Date:   10/5/2021    CBC Auto Differential     Standing Status:   Future     Standing Expiration Date:   4/5/2022    Comprehensive Metabolic Panel, Fasting     Standing Status:   Future     Standing Expiration Date:   4/5/2022    Lipid, Fasting     Standing Status:   Future     Standing Expiration Date:   4/5/2022    TSH without Reflex     Standing Status:   Future     Standing Expiration Date:   4/5/2022    Urinalysis Reflex to Culture     Standing Status:   Future     Standing Expiration Date:   4/5/2022     Order Specific Question:   SPECIFY(EX-CATH,MIDSTREAM,CYSTO,ETC)?      Answer:   midstream    Vitamin D 25 Hydroxy     Standing Status:   Future     Standing Expiration Date:   4/5/2022    Psa screening     Standing Status:   Future     Standing Expiration Date:   4/5/2022    Hepatitis C Antibody     Standing Status:   Future     Standing Expiration Date:   4/5/2022    HIV Screen     Standing Status:   Future     Standing Expiration Date:   4/5/2022    Varicella Zoster Antibody, IgG     Standing Status:   Future     Standing Expiration Date:   4/5/2022    Hemoglobin A1C     Standing Status:   Future     Standing Expiration Date:   10/5/2021    Holter Monitor 24 Hour     Standing Status:   Future     Standing Expiration Date:   4/5/2022     Scheduling Instructions:      Palpitations     Order Specific Question:   Reason for Exam?     Answer: Tachycardia     No orders of the defined types were placed in this encounter. There are no discontinued medications. Health Maintenance Due   Topic Date Due    Potassium monitoring  1955    AAA screen  1955    Hepatitis C screen  1955    DTaP/Tdap/Td vaccine (1 - Tdap) 06/20/1972    Lipid screen  06/20/1993    Shingles Vaccine (1 of 2) 06/20/2003    Colon cancer screen colonoscopy  06/20/2003    Creatinine monitoring  09/25/2015    Pneumococcal 65+ years Vaccine (1 of 1 - PPSV23) 06/20/2018    Flu vaccine (1) 09/01/2020      Return in about 4 weeks (around 11/2/2020) for Rev. results, afib, cardiac consult. Last received counseling on the following healthy behaviors: nutrition, exercise and medication adherence  Reviewed prior labs and health maintenance  Continue current medications, diet and exercise. Discussed use, benefit, and side effects of prescribed medications. Barriers to medication compliance addressed. Patient given educational materials - see patient instructions  Was a self-tracking handout given in paper form or via [employer]? Yes    Requested Prescriptions      No prescriptions requested or ordered in this encounter       All patient questions answered. Patient voiced understanding. Quality Measures    Body mass index is 37.07 kg/m². Elevated. Weight control planned discussed Healthy diet and regular exercise. BP: (!) 140/140. Blood pressure is high. Treatment plan consists of sent to the ER.   BP Readings from Last 2 Encounters: 10/05/20 (!) 136/95   10/05/20 (!) 140/140     No flowsheet data found. The patient does not have a history of falls. I did not - not indicated , complete a risk assessment for falls. A plan of care for falls patient declines any further evaluation/treatment for increased falls risk    No results found for: LDLCALC, LDLCHOLESTEROL, LDLDIRECT (goal LDL reduction with dx if diabetes is 50% LDL reduction)    PHQ Scores 10/5/2020   PHQ2 Score 0   PHQ9 Score 0     Interpretation of Total Score Depression Severity: 1-4 = Minimal depression, 5-9 = Mild depression, 10-14 = Moderate depression, 15-19 = Moderately severe depression, 20-27 = Severe depression     This note was completed by using the assistance of a speech-recognition program. However, inadvertent computerized transcription errors may be present. Although every effort was made to ensure accuracy, no guarantees can be provided that every mistake has been identified and corrected by editing   An electronic signature was used to authenticate this note.   --ANGELITO Madera - CNP on 10/5/2020 at 6:57 PM

## 2020-10-05 NOTE — PATIENT INSTRUCTIONS
Patient Education        Atrial Fibrillation: Care Instructions  Your Care Instructions     Atrial fibrillation is an irregular and often fast heartbeat. Treating this condition is important for several reasons. It can cause blood clots, which can travel from your heart to your brain and cause a stroke. If you have a fast heartbeat, you may feel lightheaded, dizzy, and weak. An irregular heartbeat can also increase your risk for heart failure. Atrial fibrillation is often the result of another heart condition, such as high blood pressure or coronary artery disease. Making changes to improve your heart condition will help you stay healthy and active. Follow-up care is a key part of your treatment and safety. Be sure to make and go to all appointments, and call your doctor if you are having problems. It's also a good idea to know your test results and keep a list of the medicines you take. How can you care for yourself at home? Medicines  · Take your medicines exactly as prescribed. Call your doctor if you think you are having a problem with your medicine. You will get more details on the specific medicines your doctor prescribes. · If your doctor has given you a blood thinner to prevent a stroke, be sure you get instructions about how to take your medicine safely. Blood thinners can cause serious bleeding problems. · Do not take any vitamins, over-the-counter drugs, or herbal products without talking to your doctor first.  Lifestyle changes  · Do not smoke. Smoking can increase your chance of a stroke and heart attack. If you need help quitting, talk to your doctor about stop-smoking programs and medicines. These can increase your chances of quitting for good. · Eat a heart-healthy diet. · Stay at a healthy weight. Lose weight if you need to. · Limit alcohol to 2 drinks a day for men and 1 drink a day for women. Too much alcohol can cause health problems. · Avoid colds and flu.  Get a pneumococcal vaccine shot. If you have had one before, ask your doctor whether you need another dose. Get a flu shot every year. If you must be around people with colds or flu, wash your hands often. Activity  · If your doctor recommends it, get more exercise. Walking is a good choice. Bit by bit, increase the amount you walk every day. Try for at least 30 minutes on most days of the week. You also may want to swim, bike, or do other activities. Your doctor may suggest that you join a cardiac rehabilitation program so that you can have help increasing your physical activity safely. · Start light exercise if your doctor says it is okay. Even a small amount will help you get stronger, have more energy, and manage stress. Walking is an easy way to get exercise. Start out by walking a little more than you did in the hospital. Gradually increase the amount you walk. · When you exercise, watch for signs that your heart is working too hard. You are pushing too hard if you cannot talk while you are exercising. If you become short of breath or dizzy or have chest pain, sit down and rest immediately. · Check your pulse regularly. Place two fingers on the artery at the palm side of your wrist, in line with your thumb. If your heartbeat seems uneven or fast, talk to your doctor. When should you call for help? [id number] anytime you think you may need emergency care. For example, call if:  · You have symptoms of a heart attack. These may include:  ? Chest pain or pressure, or a strange feeling in the chest.  ? Sweating. ? Shortness of breath. ? Nausea or vomiting. ? Pain, pressure, or a strange feeling in the back, neck, jaw, or upper belly or in one or both shoulders or arms. ? Lightheadedness or sudden weakness. ? A fast or irregular heartbeat. After you call 911, the  may tell you to chew 1 adult-strength or 2 to 4 low-dose aspirin. Wait for an ambulance. Do not try to drive yourself. · You have symptoms of a stroke.  These may include:  ? Sudden numbness, tingling, weakness, or loss of movement in your face, arm, or leg, especially on only one side of your body. ? Sudden vision changes. ? Sudden trouble speaking. ? Sudden confusion or trouble understanding simple statements. ? Sudden problems with walking or balance. ? A sudden, severe headache that is different from past headaches. · You passed out (lost consciousness). Call your doctor now or seek immediate medical care if:  · You have new or increased shortness of breath. · You feel dizzy or lightheaded, or you feel like you may faint. · Your heart rate becomes irregular. · You can feel your heart flutter in your chest or skip heartbeats. Tell your doctor if these symptoms are new or worse. Watch closely for changes in your health, and be sure to contact your doctor if you have any problems. Where can you learn more? Go to https://[employer] ([email or web address] org and sign in to your [employer] account. Enter U020 in the [employer] box to learn more about \"Atrial Fibrillation: Care Instructions. \"     If you do not have an account, please click on the \"Sign Up Now\" link. Current as of: December 16, 2019               Content Version: 12.5  © 9747-7375 Healthwise, Incorporated. Care instructions adapted under license by [facility] Center[facility] ([facility]). If you have questions about a medical condition or this instruction, always ask your healthcare professional. [place] [address] any warranty or liability for your use of this information.

## 2020-10-06 ENCOUNTER — TELEPHONE (OUTPATIENT)
Dept: FAMILY MEDICINE CLINIC | Age: 65
End: 2020-10-06

## 2020-10-06 PROBLEM — I50.9 CHF (CONGESTIVE HEART FAILURE) (HCC): Status: ACTIVE | Noted: 2020-10-06

## 2020-10-06 LAB
AMPHETAMINE SCREEN URINE: NEGATIVE
ANION GAP SERPL CALCULATED.3IONS-SCNC: 10 MMOL/L (ref 9–17)
ANTI-XA UNFRAC HEPARIN: 0.45 IU/L (ref 0.3–0.7)
ANTI-XA UNFRAC HEPARIN: 0.69 IU/L (ref 0.3–0.7)
ANTI-XA UNFRAC HEPARIN: 1.02 IU/L (ref 0.3–0.7)
BARBITURATE SCREEN URINE: NEGATIVE
BENZODIAZEPINE SCREEN, URINE: NEGATIVE
BILIRUBIN URINE: NEGATIVE
BUN BLDV-MCNC: 17 MG/DL (ref 8–23)
BUN/CREAT BLD: ABNORMAL (ref 9–20)
BUPRENORPHINE URINE: NORMAL
CALCIUM SERPL-MCNC: 9.4 MG/DL (ref 8.6–10.4)
CANNABINOID SCREEN URINE: NEGATIVE
CHLORIDE BLD-SCNC: 101 MMOL/L (ref 98–107)
CO2: 24 MMOL/L (ref 20–31)
COCAINE METABOLITE, URINE: NEGATIVE
COLOR: YELLOW
COMMENT UA: ABNORMAL
CREAT SERPL-MCNC: 0.75 MG/DL (ref 0.7–1.2)
EKG ATRIAL RATE: 93 BPM
EKG Q-T INTERVAL: 344 MS
EKG QRS DURATION: 96 MS
EKG QTC CALCULATION (BAZETT): 452 MS
EKG R AXIS: 28 DEGREES
EKG T AXIS: 29 DEGREES
EKG VENTRICULAR RATE: 104 BPM
ESTIMATED AVERAGE GLUCOSE: 111 MG/DL
GFR AFRICAN AMERICAN: >60 ML/MIN
GFR NON-AFRICAN AMERICAN: >60 ML/MIN
GFR SERPL CREATININE-BSD FRML MDRD: ABNORMAL ML/MIN/{1.73_M2}
GFR SERPL CREATININE-BSD FRML MDRD: ABNORMAL ML/MIN/{1.73_M2}
GLUCOSE BLD-MCNC: 114 MG/DL (ref 70–99)
GLUCOSE URINE: NEGATIVE
HBA1C MFR BLD: 5.5 % (ref 4–6)
INR BLD: 1.1
KETONES, URINE: ABNORMAL
LEUKOCYTE ESTERASE, URINE: NEGATIVE
LV EF: 32 %
LVEF MODALITY: NORMAL
MDMA URINE: NORMAL
METHADONE SCREEN, URINE: NEGATIVE
METHAMPHETAMINE, URINE: NORMAL
NITRITE, URINE: NEGATIVE
OPIATES, URINE: NEGATIVE
OXYCODONE SCREEN URINE: NEGATIVE
PH UA: 5.5 (ref 5–8)
PHENCYCLIDINE, URINE: NEGATIVE
POTASSIUM SERPL-SCNC: 3.7 MMOL/L (ref 3.7–5.3)
PROPOXYPHENE, URINE: NORMAL
PROTEIN UA: NEGATIVE
PROTHROMBIN TIME: 14.4 SEC (ref 11.8–14.6)
SODIUM BLD-SCNC: 135 MMOL/L (ref 135–144)
SPECIFIC GRAVITY UA: 1.01 (ref 1–1.03)
TEST INFORMATION: NORMAL
TRICYCLIC ANTIDEPRESSANTS, UR: NORMAL
TROPONIN INTERP: ABNORMAL
TROPONIN T: ABNORMAL NG/ML
TROPONIN, HIGH SENSITIVITY: 30 NG/L (ref 0–22)
TURBIDITY: CLEAR
URINE HGB: NEGATIVE
UROBILINOGEN, URINE: NORMAL

## 2020-10-06 PROCEDURE — 6360000002 HC RX W HCPCS: Performed by: STUDENT IN AN ORGANIZED HEALTH CARE EDUCATION/TRAINING PROGRAM

## 2020-10-06 PROCEDURE — 93010 ELECTROCARDIOGRAM REPORT: CPT | Performed by: INTERNAL MEDICINE

## 2020-10-06 PROCEDURE — 80048 BASIC METABOLIC PNL TOTAL CA: CPT

## 2020-10-06 PROCEDURE — 97116 GAIT TRAINING THERAPY: CPT

## 2020-10-06 PROCEDURE — 36415 COLL VENOUS BLD VENIPUNCTURE: CPT

## 2020-10-06 PROCEDURE — 93306 TTE W/DOPPLER COMPLETE: CPT

## 2020-10-06 PROCEDURE — 85520 HEPARIN ASSAY: CPT

## 2020-10-06 PROCEDURE — 81003 URINALYSIS AUTO W/O SCOPE: CPT

## 2020-10-06 PROCEDURE — 80307 DRUG TEST PRSMV CHEM ANLYZR: CPT

## 2020-10-06 PROCEDURE — 85610 PROTHROMBIN TIME: CPT

## 2020-10-06 PROCEDURE — 99233 SBSQ HOSP IP/OBS HIGH 50: CPT | Performed by: INTERNAL MEDICINE

## 2020-10-06 PROCEDURE — 2060000000 HC ICU INTERMEDIATE R&B

## 2020-10-06 PROCEDURE — 2580000003 HC RX 258: Performed by: STUDENT IN AN ORGANIZED HEALTH CARE EDUCATION/TRAINING PROGRAM

## 2020-10-06 PROCEDURE — 6370000000 HC RX 637 (ALT 250 FOR IP): Performed by: INTERNAL MEDICINE

## 2020-10-06 PROCEDURE — 84484 ASSAY OF TROPONIN QUANT: CPT

## 2020-10-06 PROCEDURE — 6370000000 HC RX 637 (ALT 250 FOR IP): Performed by: STUDENT IN AN ORGANIZED HEALTH CARE EDUCATION/TRAINING PROGRAM

## 2020-10-06 PROCEDURE — 97162 PT EVAL MOD COMPLEX 30 MIN: CPT

## 2020-10-06 PROCEDURE — 82088 ASSAY OF ALDOSTERONE: CPT

## 2020-10-06 PROCEDURE — 84244 ASSAY OF RENIN: CPT

## 2020-10-06 RX ORDER — DILTIAZEM HYDROCHLORIDE 240 MG/1
240 CAPSULE, COATED, EXTENDED RELEASE ORAL DAILY
Qty: 30 CAPSULE | Refills: 3 | Status: ON HOLD | OUTPATIENT
Start: 2020-10-07 | End: 2020-10-07

## 2020-10-06 RX ORDER — DILTIAZEM HYDROCHLORIDE 240 MG/1
240 CAPSULE, COATED, EXTENDED RELEASE ORAL DAILY
Status: DISCONTINUED | OUTPATIENT
Start: 2020-10-06 | End: 2020-10-07

## 2020-10-06 RX ORDER — HEPARIN SODIUM 1000 [USP'U]/ML
80 INJECTION, SOLUTION INTRAVENOUS; SUBCUTANEOUS PRN
Status: DISCONTINUED | OUTPATIENT
Start: 2020-10-06 | End: 2020-10-07 | Stop reason: HOSPADM

## 2020-10-06 RX ORDER — HEPARIN SODIUM 1000 [USP'U]/ML
40 INJECTION, SOLUTION INTRAVENOUS; SUBCUTANEOUS PRN
Status: DISCONTINUED | OUTPATIENT
Start: 2020-10-06 | End: 2020-10-07 | Stop reason: HOSPADM

## 2020-10-06 RX ADMIN — Medication 10 ML: at 20:42

## 2020-10-06 RX ADMIN — MULTIPLE VITAMINS W/ MINERALS TAB 1 TABLET: TAB at 10:00

## 2020-10-06 RX ADMIN — HEPARIN SODIUM AND DEXTROSE 9 UNITS/KG/HR: 10000; 5 INJECTION INTRAVENOUS at 19:07

## 2020-10-06 RX ADMIN — DILTIAZEM HYDROCHLORIDE 240 MG: 240 CAPSULE, COATED, EXTENDED RELEASE ORAL at 10:00

## 2020-10-06 ASSESSMENT — ENCOUNTER SYMPTOMS
COUGH: 0
DIARRHEA: 0
SHORTNESS OF BREATH: 0
CONSTIPATION: 0
ALLERGIC/IMMUNOLOGIC NEGATIVE: 1
EYES NEGATIVE: 1
BLOOD IN STOOL: 0
VOMITING: 0
NAUSEA: 0
ABDOMINAL PAIN: 0

## 2020-10-06 ASSESSMENT — PAIN SCALES - GENERAL
PAINLEVEL_OUTOF10: 0

## 2020-10-06 NOTE — PLAN OF CARE
Problem: Infection:  Goal: Will remain free from infection  Description: Will remain free from infection  Outcome: Ongoing     Problem: Safety:  Goal: Free from intentional harm  Description: Free from intentional harm  Outcome: Ongoing   Pt assessed as a fall risk this shift. Remains free from falls and accidental injury at this time. Fall precautions in place, including falling star sign and fall risk band on pt. Floor free from obstacles, and bed is locked and in lowest position. Adequate lighting provided. Pt encouraged to call before getting OOB for any need. Will continue to monitor needs during hourly rounding, and reinforce education on use of call light.     Problem: Pain:  Goal: Patient's pain/discomfort is manageable  Description: Patient's pain/discomfort is manageable  Outcome: Ongoing   Pt had no c/o pin this shift  Problem: Daily Care:  Goal: Daily care needs are met  Description: Daily care needs are met  Outcome: Ongoing     Problem: Skin Integrity:  Goal: Skin integrity will stabilize  Description: Skin integrity will stabilize  Outcome: Ongoing  No new skin breakdown noted this shift

## 2020-10-06 NOTE — DISCHARGE SUMMARY
[address]    Discharge Summary     Patient ID: Remi Carlin  :  1955   MRN: [id number]     ACCOUNT:  [de-identified]   Patient's PCP: ANGELITO Berger CNP  Admit Date: 10/5/2020   Discharge Date: 10/6/2020  Length of Stay: 1  Code Status:  Full Code  Admitting Physician: Shon Mares MD  Discharge Physician: Quang Merchant MD     Active Discharge Diagnoses:       Primary Problem  Atrial fibrillation with RVR [facility] Problems    Diagnosis Date Noted    CHF (congestive heart failure) ([facility] [I50.9] 10/06/2020    [facility] [I48.91] 10/05/2020    Hypertension [I10] 10/05/2020       Admission Condition:  stable     Discharged Condition: fair    Hospital Stay:       Hospital Course:  Remi Carlin is a 77 y.o. male who was admitted for the management of   Atrial fibrillation with RVR ([facility] , presented to ER with Palpitations; Shortness of Breath; and Hypertension  Pt came to ED with cc of shortness and palpitation. Pt say it started a week about and he had episode of palpitation and shortness of breath. It got worse for a week and he went to the doctor who said to come to ED. EKG showed A Fib with RVR, he was on Cardizem drip and heparin drip and shifted to the floor. Cardiology saw the pt he was switched to oral Cardizem and his rate is controlled. Pt was prescribed Eliquis and his f/u with Dr. Ludmila Betancourt in 2 weeks. Pt agreed with the plan and will follow up with the cardiologist as an outpatient.     Cardizem, Heparin    Significant therapeutic interventions: ECHO, EKG, CXR    Significant Diagnostic Studies: Cardizem, Heparin  Labs / Micro:  CBC:   Lab Results   Component Value Date    WBC 6.8 10/05/2020    RBC 4.30 10/05/2020    HGB 14.5 10/05/2020    HCT 42.3 10/05/2020    MCV 98.3 10/05/2020    MCH 33.8 10/05/2020    MCHC 34.4 10/05/2020    RDW 13.5 10/05/2020  10/05/2020     BMP:    Lab Results   Component Value Date    GLUCOSE 114 10/06/2020     10/06/2020    K 3.7 10/06/2020     10/06/2020    CO2 24 10/06/2020    ANIONGAP 10 10/06/2020    BUN 17 10/06/2020    CREATININE 0.75 10/06/2020    BUNCRER NOT REPORTED 10/06/2020    CALCIUM 9.4 10/06/2020    LABGLOM >60 10/06/2020    GFRAA >60 10/06/2020    GFR      10/06/2020    GFR NOT REPORTED 10/06/2020     HFP:    Lab Results   Component Value Date    PROT 6.5 10/05/2020     CMP:    Lab Results   Component Value Date    GLUCOSE 114 10/06/2020     10/06/2020    K 3.7 10/06/2020     10/06/2020    CO2 24 10/06/2020    BUN 17 10/06/2020    CREATININE 0.75 10/06/2020    ANIONGAP 10 10/06/2020    ALKPHOS 125 10/05/2020    ALT 30 10/05/2020    AST 37 10/05/2020    BILITOT 0.85 10/05/2020    LABALBU 4.1 10/05/2020    ALBUMIN NOT REPORTED 10/05/2020    LABGLOM >60 10/06/2020    GFRAA >60 10/06/2020    GFR      10/06/2020    GFR NOT REPORTED 10/06/2020    PROT 6.5 10/05/2020    CALCIUM 9.4 10/06/2020     PT/INR:    Lab Results   Component Value Date    PROTIME 14.4 10/06/2020    INR 1.1 10/06/2020     PTT:   Lab Results   Component Value Date    APTT 29.9 10/05/2020     FLP:    Lab Results   Component Value Date    CHOL 194 10/05/2020    TRIG 64 10/05/2020    HDL 68 10/05/2020     U/A:    Lab Results   Component Value Date    COLORU YELLOW 10/06/2020    TURBIDITY CLEAR 10/06/2020    SPECGRAV 1.013 10/06/2020    HGBUR NEGATIVE 10/06/2020    PHUR 5.5 10/06/2020    PROTEINU NEGATIVE 10/06/2020    GLUCOSEU NEGATIVE 10/06/2020    KETUA SMALL 10/06/2020    BILIRUBINUR NEGATIVE 10/06/2020    UROBILINOGEN Normal 10/06/2020    NITRU NEGATIVE 10/06/2020    LEUKOCYTESUR NEGATIVE 10/06/2020     TSH:    Lab Results   Component Value Date    TSH 3.07 10/05/2020         Radiology:    Xr Chest Portable    Result Date: 10/5/2020  EXAMINATION: ONE XRAY VIEW OF THE CHEST 10/5/2020 4:00 pm COMPARISON: None.  HISTORY: ORDERING SYSTEM PROVIDED HISTORY: SOB TECHNOLOGIST PROVIDED HISTORY: SOB Reason for Exam: sob, high blood pressure Acuity: Unknown Type of Exam: Unknown FINDINGS: Cardiac silhouette is enlarged. Lungs are clear. No acute bony abnormality. No acute findings         Consultations:    Consults:     Final Specialist Recommendations/Findings:   IP CONSULT TO INTERNAL MEDICINE  IP CONSULT TO SOCIAL WORK  IP CONSULT TO CARDIOLOGY      The patient was seen and examined on day of discharge and this discharge summary is in conjunction with any daily progress note from day of discharge. Discharge plan:       Disposition: Home    Physician Follow Up: Lindsay Wilson DO  [address] [place]  [phone]    In 2 weeks         Requiring Further Evaluation/Follow Up POST HOSPITALIZATION/Incidental Findings:     Diet: cardiac diet    Activity: As tolerated    Instructions to Patient: Pt is prescribed ELIQUIS and CARDIZEM which is already in the pharmacy. Pt is requested to take his medication as prescribed and if there is any symptoms please come back.     Discharge Medications:      Medication List      START taking these medications    dilTIAZem 240 MG extended release capsule  Commonly known as:  CARDIZEM CD  Take 1 capsule by mouth daily  Start taking on:  October 7, 2020        ASK your doctor about these medications    naproxen sodium 220 MG tablet  Commonly known as:  ANAPROX     therapeutic multivitamin-minerals tablet           Where to Get Your Medications      These medications were sent to [id number] [address], [address] [place] [address] Drive  [address] [place] [id number]    Phone:  [phone]   · dilTIAZem 240 MG extended release capsule         Time Spent on discharge is  31 mins in patient examination, evaluation, counseling as well as medication reconciliation, prescriptions for required medications, discharge plan and follow up.    Electronically signed by   Callie Valadez MD  10/6/2020  2:23 PM      Thank you ANGELITO De La Torre - LINDSAY for the opportunity to be involved in this patient's care.

## 2020-10-06 NOTE — TELEPHONE ENCOUNTER
Please call patient to set up a follow up appointment after recent discharge from the hospital. Thanks.

## 2020-10-06 NOTE — PROGRESS NOTES
applicable  Family / Caregiver Present: No  Referring Practitioner: Janny Borges MD  Referral Date : 10/05/20  Diagnosis: Afib with RVR  Follows Commands: Within Functional Limits  Other (Comment): OK with Nurse Yasmine to proceed with PT eval  Subjective  Subjective: Pt reports that Heart was pounding and had high blood pressure. patient reports 3 episodes of palpitations last week. Currently asymptomatic  Pain Screening  Patient Currently in Pain: Denies  Vital Signs  Patient Currently in Pain: Denies       Orientation  Orientation  Overall Orientation Status: Within Normal Limits(Where: [facility]; month: 10October.  president: sharif: date of birth [birth year])  Social/Functional History  Social/Functional History  Lives With: Alone  Type of Home: Apartment(2nd floor apartment)  Home Layout: Multi-level  Home Access: Stairs to enter with rails  Entrance Stairs - Number of Steps: 15  Entrance Stairs - Rails: Left  Bathroom Shower/Tub: Tub/Shower unit, Curtain  Bathroom Toilet: Standard  Home Equipment: (No DME for ambulation)  ADL Assistance: Independent  Homemaking Assistance: Independent  Homemaking Responsibilities: Yes  Ambulation Assistance: Independent(No device)  Transfer Assistance: Independent  Active : Yes  Occupation: Full time employment  Type of occupation: Diesal technician at [employer]        Objective     Observation/Palpation  Observation: Peripheral IV Left hand & Left antecubital    AROM RLE (degrees)  RLE AROM: WNL  AROM LLE (degrees)  LLE AROM : WNL  PROM RUE (degrees)  RUE General PROM: See OT for UE status  PROM LUE (degrees)  LUE General PROM: See OT for UE status  AROM LUE (degrees)  LUE General AROM: See OT for UE status  Strength RLE  Strength RLE: WFL  Comment: grossly 5/5  Strength LLE  Strength LLE: WFL  Comment: grossly 5/5  Strength RUE  Comment: See OT for UE status  Strength LUE  Comment: See OT for UE status     Sensation  Overall Sensation Status: Impaired(Occasional C/O carpal tunnel symtpoms in R hand)  Bed mobility  Rolling to Right: Modified independent  Supine to Sit: Modified independent  Scooting: Modified independent  Transfers  Sit to Stand: Supervision  Stand to sit: Supervision  Bed to Chair: Stand by assistance  Stand Pivot Transfers: Contact guard assistance  Ambulation  Ambulation?: Yes  Ambulation 1  Surface: level tile  Device: No Device  Assistance: Contact guard assistance  Distance: 100' x2  Comments: Initially at rest O2 sat 98; HR 90. Mid-walk O2 sat 98: .  End of walk patient seated O2 sat 97: HR 80  Stairs/Curb  Stairs?: No     Balance  Sitting - Static: Good  Sitting - Dynamic: Good  Standing - Static: Good(No device)  Standing - Dynamic: Good(No device)        Plan   Plan  Times per week: 2-3 treatments/ 3 days  Times per day: (2-3 treatments/ 3 days)  Specific instructions for Next Treatment: Instruct pt on HEP and advance to steps  Current Treatment Recommendations: Transfer Training, Endurance Training, Patient/Caregiver Education & Training, Gait Training, Stair training  Safety Devices  Type of devices: Call light within reach, Left in chair, Nurse notified, Gait belt(Nurse Yasmine)    G-Code       OutComes Score                                                  AM-PAC Score  AM-PAC Inpatient Mobility Raw Score : 18 (10/06/20 1000)  AM-PAC Inpatient T-Scale Score : 43.63 (10/06/20 1000)  Mobility Inpatient CMS 0-100% Score: 46.58 (10/06/20 1000)  Mobility Inpatient CMS G-Code Modifier : CK (10/06/20 1000)          Goals  Short term goals  Time Frame for Short term goals: 2-3 treatments/ 3 days  Short term goal 1: Patient to demonstrate IND transfers without assistive device  Short term goal 2: Patient ambulate 200' IND for community ambulation  Short term goal 3: Patient to demonstrate ability to ascend/descend 15 steps with Rail independently  Short term goal 4: Patient to demonstrate understanding and independence of energy conservation techniques and independence with HEP  Patient Goals   Patient goals :  To return to home and to work and live independently       [address]   Time In 1000         Time Out 1030         Minutes 30         Timed Code Treatment Minutes: [address] is completed by PRANEETH Connelly under the direct supervision of co-signing therapist, who agrees with all documentation and evaluation/treatment  The above documentation completed by Student Physical Therapist Barbra Bateman  was reviewed and accepted by the supervising Clinical instructor [facility], PT.

## 2020-10-06 NOTE — CONSULTS
[facility]  In Patient Cardiology Consult             Date:   10/6/2020  Patient name: Timothy Friend  Date of admission:  10/5/2020  3:37 PM  MRN:   [id number]  YOB: 1955    Reason for Admission:  SOB, Palpitations     CHIEF COMPLAINT:  SOB, Palpitations     History Obtained From:  Pt and chart    HISTORY OF PRESENT ILLNESS:    This is a 64-year-old male presented with worsening shortness of breath. Also presented with palpitations. He states he did wake up in the middle of the night and felt his heart racing. This happened a few times. He came into the emergency room. Was found to be in atrial fibrillation with RVR. Has since been placed on Cardizem and heparin drips. He denies any cardiac history whatsoever. Currently his heart rate is improved to the 70s and 80s while on Cardizem of 10 mg p.o. We are consulted for his new onset A. fib. No chest pains, no le edema, no orthopnea, no PND. Past Medical History:   has no past medical history on file. Past Surgical History:   has no past surgical history on file. Home Medications:    Prior to Admission medications    Medication Sig Start Date End Date Taking? Authorizing Provider   Multiple Vitamins-Minerals (THERAPEUTIC MULTIVITAMIN-MINERALS) tablet Take 1 tablet by mouth daily   Yes Historical Provider, MD   naproxen sodium (ANAPROX) 220 MG tablet Take 220 mg by mouth 2 times daily (with meals)   Yes Historical Provider, MD       Allergies:  No known allergies    Social History:   reports that he quit smoking about 45 years ago. His smoking use included cigarettes. He has a 15.00 pack-year smoking history. He has never used smokeless tobacco. He reports previous alcohol use. He reports that he does not use drugs. Family History:   NEg for early CAD    REVIEW OF SYSTEMS:    · Constitutional: there has been no unanticipated weight loss. There's been No change in energy level, No change in activity level. · Eyes: No visual changes or diplopia. No scleral icterus. · ENT: No Headaches, hearing loss or vertigo. No mouth sores or sore throat. · Cardiovascular: As HPI  · Respiratory: As HPI  · Gastrointestinal: No abdominal pain, appetite loss, blood in stools. No change in bowel or bladder habits. · Genitourinary: No dysuria, trouble voiding, or hematuria. · Musculoskeletal:  No gait disturbance, No weakness or joint complaints. · Integumentary: No rash or pruritis. · Neurological: No headache, diplopia, change in muscle strength, numbness or tingling. No change in gait, balance, coordination, mood, affect, memory, mentation, behavior. · Psychiatric: No anxiety, or depression. · Endocrine: No temperature intolerance. No excessive thirst, fluid intake, or urination. No tremor. · Hematologic/Lymphatic: No abnormal bruising or bleeding, blood clots or swollen lymph nodes. · Allergic/Immunologic: No nasal congestion or hives. PHYSICAL EXAM:    Physical Examination:    BP (!) 121/91   Pulse 70   Temp 98.4 °F (36.9 °C) (Oral)   Resp 18   Ht 5' 9\" (1.753 m)   Wt 244 lb 7.8 oz (110.9 kg)   SpO2 93%   BMI 36.10 kg/m²    Constitutional and General Appearance: alert, cooperative, no distress and appears stated age  HEENT: PERRL, no cervical lymphadenopathy. No masses palpable. Normal oral mucosa  Respiratory:  · Normal excursion and expansion without use of accessory muscles  · Resp Auscultation: Good respiratory effort. No for increased work of breathing.  On auscultation: Clear  Cardiovascular:  · The apical impulse is not displaced  · Heart tones are crisp and normal. irreg irregular S1 and S2. Murmurs: none  · Jugular venous pulsation Normal  · The carotid upstroke is normal in amplitude and contour without delay or bruit  · Peripheral pulses are symmetrical and full   Abdomen:  · No masses or tenderness  · Bowel sounds present  Extremities:  ·  No Cyanosis or Clubbing  ·  Lower extremity edema: none  · Skin: Warm and dry  Neurological:  · Alert and oriented. · Moves all extremities well  · No abnormalities of mood, affect, memory, mentation, or behavior are noted    DATA:    Diagnostics:      EKG:   Results for orders placed or performed during the hospital encounter of 10/05/20   EKG 12 Lead   Result Value Ref Range    Ventricular Rate 104 BPM    Atrial Rate 93 BPM    QRS Duration 96 ms    Q-T Interval 344 ms    QTc Calculation (Bazett) 452 ms    R Axis 28 degrees    T Axis 29 degrees    Narrative    Atrial fibrillation with rapid ventricular response with premature ventricular or aberrantly conducted complexes  Cannot rule out Anterior infarct , age undetermined  Abnormal ECG  No previous ECGs available     abs:     CBC:   Recent Labs     10/05/20  1605   WBC 6.8   HGB 14.5   HCT 42.3        BMP:   Recent Labs     10/05/20  1605 10/06/20  0349    135   K 4.2 3.7   CO2 23 24   BUN 19 17   CREATININE 0.84 0.75   LABGLOM >60 >60   GLUCOSE 98 114*     BNP: No results for input(s): BNP in the last 72 hours. PT/INR:   Recent Labs     10/05/20  1605 10/06/20  0349   PROTIME 13.9 14.4   INR 1.1 1.1     APTT:  Recent Labs     10/05/20  1605   APTT 29.9     CARDIAC ENZYMES:  Recent Labs     10/05/20  1605 10/06/20  0824   TROPHS 38* 30*     FASTING LIPID PANEL:  Lab Results   Component Value Date    HDL 68 10/05/2020    TRIG 64 10/05/2020     LIVER PROFILE:  Recent Labs     10/05/20  1605   AST 37   ALT 30   LABALBU 4.1       IMPRESSION:    Patient Active Problem List   Diagnosis    Hypertension    Has been smoking tobacco for 30 years or more    Irregular heartbeat    Atypical chest pain    Palpitations    Dyslipidemia    Atrial fibrillation with RVR ([address]    Neoplasm of bladder    Testicular hypofunction    CHF (congestive heart failure) ([address]     - New onset Parox AF- with RVR.  [id number] is 2 (HTN, Age)  - NSTEMI type 2- due to AF with RVR, no trend to suggest ongoing ischemia/injury  - Suspect undiagnosed LARRY- numerous symptoms compatible with LARRY  - HTN- ? Not on medication  - DL - ? Not on medications     RECOMMENDATIONS:  - will start oral cardizem  - will wean off drip  - [facility]- plan for eliquis 5 mg bid on d/c.   - discussed at length need for anticoagulation given his CHADSVASC, discussed risks, benefits, alternative. Patient agrees to proceed. - Echo is pending    Dispo: if echo is low risk and HR remains well controlled, no objection for d/c from cardiac. Can go home with cardizem and eliquis and follow up in 2 weeks for possible outpatient stress testing and possible SHEYLA/CV. Discussed with patient and nursing. Thank you for allowing me to participate in the care of this patient, please do not hesitate to call if you have any questions. Lan Amin DO, [facility], [address] [facility]  [email or web address]  [phone]

## 2020-10-06 NOTE — CARE COORDINATION
DISCHARGE PLANNING NOTE:    Prescription for eliquis is non-formulary with patients insurance. I called in Xarelto 20 mg Daily #30 with 3 refills - OOP cost is $482 and patient is agreeable as the cost will decrease once his deductible is met. Free 30 day card given and patient encouraged to follow-up with Dr. Chyna Schroeder in office as they juanita have samples to get him through to end of year and then he can start with a fresh deductible in January. Patient is agreeable to this and states even without samples he will still pay the $482 as he does not want to go coumadin with blood draws.      Electronically signed by Corky Victoria RN on 10/6/2020 at 4:35 PM

## 2020-10-06 NOTE — PROGRESS NOTES
[address]    PROGRESS NOTE             10/6/2020    9:33 AM    Name:   Gael Samuels  MRN:     [id number]     Acct:      [de-identified]   Room:   2087/2087-01   Day:  1  Admit Date:  10/5/2020  3:37 PM    PCP:  ANGELITO Clarke CNP  Code Status:  Full Code    Subjective:     C/C:   Chief Complaint   Patient presents with    Palpitations    Shortness of Breath    Hypertension     Interval History Status: improved  Pt was seen and examined by the bedside. Pt was sitting in the bed. Pt said his racing of heart, shortness of breath is improved. Pt was able to sleep for a couple of hours this morning and was tolerating food. No acute overnights events occurred. Pt denies fever, chills, nausea, vomiting, Chest pain, abdominal pain, burning urination, urgency, flank pain, blood in urine and stools. Brief History:     Please see H & P    Review of Systems:     Review of Systems   Constitutional: Negative for chills and fever. HENT: Negative. Eyes: Negative. Respiratory: Negative for cough and shortness of breath. Cardiovascular: Negative for chest pain, palpitations and leg swelling. Gastrointestinal: Negative for abdominal pain, blood in stool, constipation, diarrhea, nausea and vomiting. Endocrine: Negative for heat intolerance. Genitourinary: Negative for difficulty urinating, flank pain, hematuria and urgency. Musculoskeletal: Negative. Skin: Negative. Allergic/Immunologic: Negative. Neurological: Negative. Hematological: Negative. Psychiatric/Behavioral: Negative. Medications: Allergies:     Allergies   Allergen Reactions    No Known Allergies        Current Meds:   Scheduled Meds:    therapeutic multivitamin-minerals  1 tablet Oral Daily    sodium chloride flush  10 mL Intravenous 2 times per day     Continuous Infusions:    dilTIAZem (CARDIZEM) 125 mg in dextrose 5% 125 mL infusion 10 mg/hr (10/06/20 0436)    heparin (PORCINE) Infusion 9 Units/kg/hr (10/06/20 0555)     PRN Meds: perflutren lipid microspheres, heparin (porcine), heparin (porcine), sodium chloride flush, acetaminophen **OR** acetaminophen, polyethylene glycol, promethazine **OR** ondansetron    Data:     Past Medical History:   has no past medical history on file. Social History:   reports that he quit smoking about 45 years ago. His smoking use included cigarettes. He has a 15.00 pack-year smoking history. He has never used smokeless tobacco. He reports previous alcohol use. He reports that he does not use drugs. Family History:   Family History   Problem Relation Age of Onset    Cancer Father         sinus cancer        Vitals:  BP (!) 121/91   Pulse 70   Temp 98.4 °F (36.9 °C) (Oral)   Resp 18   Ht 5' 9\" (1.753 m)   Wt 244 lb 7.8 oz (110.9 kg)   SpO2 93%   BMI 36.10 kg/m²   Temp (24hrs), Av.3 °F (36.8 °C), Min:97.9 °F (36.6 °C), Max:98.5 °F (36.9 °C)    No results for input(s): POCGLU in the last 72 hours. I/O(24Hr): Intake/Output Summary (Last 24 hours) at 10/6/2020 0981  Last data filed at 10/6/2020 0340  Gross per 24 hour   Intake --   Output 300 ml   Net -300 ml       Labs:  [unfilled]    No results found for: SPECIAL  No results found for: CULTURE    [unfilled]    Radiology:    Xr Chest Portable    Result Date: 10/5/2020  EXAMINATION: ONE XRAY VIEW OF THE CHEST 10/5/2020 4:00 pm COMPARISON: None. HISTORY: ORDERING SYSTEM PROVIDED HISTORY: SOB TECHNOLOGIST PROVIDED HISTORY: SOB Reason for Exam: sob, high blood pressure Acuity: Unknown Type of Exam: Unknown FINDINGS: Cardiac silhouette is enlarged. Lungs are clear. No acute bony abnormality. No acute findings         Physical Examination:        Physical Exam  Vitals signs and nursing note reviewed. HENT:      Head: Normocephalic and atraumatic.       Mouth/Throat:      Mouth: Mucous membranes are moist.   Cardiovascular: Rate and Rhythm: Normal rate and regular rhythm. Pulses: Normal pulses. Heart sounds: Normal heart sounds. Pulmonary:      Effort: Pulmonary effort is normal.   Abdominal:      General: Bowel sounds are normal. There is no distension. Palpations: Abdomen is soft. There is no mass. Tenderness: There is no abdominal tenderness. There is no guarding. Hernia: No hernia is present. Musculoskeletal:      Right lower leg: No edema. Left lower leg: No edema. Neurological:      Mental Status: He is alert. Assessment:        Primary Problem  Atrial fibrillation with RVR [facility])    Active Hospital Problems    Diagnosis Date Noted    CHF (congestive heart failure) ([facility] [I50.9] 10/06/2020    [facility] with [facility] [I48.91] 10/05/2020    Hypertension [I10] 10/05/2020       Plan:        Afib with RVR:   -EKG showed Atrial fibrillation with rapid ventricular response with premature ventricular.  -Troponin 38----> 30   -Pro-BNP 2712  -D-Dimer: 0.50  -CXR: showed boot-shaped heart  -TSH: 3.07  -Mg; 2.1 K: 4.2  -Echo pending. -ASA: 324 mg  - Diltiazem 125 mg infusion  - Heparin infusion    - Patient may need oral anticoagulation for stroke risk reduction later on. His score is 2 on [id number] score.    -Consulted cardiology, will appreciate their Praful Gann MD  10/6/2020  9:33 AM   Attending Physician Statement  I have discussed the care of Aide Chau and I have examined the patient myselft and taken ros and hpi , including pertinent history and exam findings,  with the resident. I have reviewed the key elements of all parts of the encounter with the resident. I agree with the assessment, plan and orders as documented by the resident.   Atrial fibrillation with rapid ventricular response and new onset no sleep apnea no alcohol abuse echo ordered for atrial diameter rate control with IV Cardizem drip anticoagulation required will change to oral Cardizem and oral anticoagulation  Discharge planning tomorrow when rate is better control needs EP eval for ablation    Electronically signed by Valerio Case MD

## 2020-10-06 NOTE — CARE COORDINATION
DISCHARGE PLANNING NOTE:    Prescription for Eliquis 5 mg BID #60 with 3 refills was called into [facility] under Dr. Didi Enamorado.    Will call back shortly to obtain OOP cost.     Electronically signed by Rowan Sherwood RN on 10/6/2020 at 11:37 AM

## 2020-10-06 NOTE — PROGRESS NOTES
Dr Susan Shaw called to update on patient echo. EF 30-40%, orders received to keep on heparin overnight and NPO after midnight  For possible cath. Ok to continue po cardizem.

## 2020-10-06 NOTE — CARE COORDINATION
CASE MANAGEMENT NOTE:    Admission Date:  10/5/2020 Alice Andres is a 77 y.o.  male    Admitted for : Atrial fibrillation with RVR ([facility] [address] [I48.91]    Met with:  Patient    PCP:  Karis Grewal NP                                Insurance:  Medicare      Current Residence/ Living Arrangements:  independently at home             Current Services PTA:  No    Is patient agreeable to VNS: No    Freedom of choice provided:  Yes    List of [address] provided: No    VNS chosen:  No    DME:  none    Home Oxygen: No    Nebulizer: No    CPAP/BIPAP: No    Supplier: N/A    Potential Assistance Needed: Yes - Cost of Eliquis for new A Fib    SNF needed: No    Freedom of choice and list provided: NA    Pharmacy:  [facility]       Does Patient want to use MEDS to BEDS? No    Is patient currently receiving oral anticoagulation therapy? Yes - New Eliquis    [facility] with [facility] Score greater than 14[facility]? No  If yes, pt needs a follow up appointment made within 7 days. Family Members/Caregivers that pt would like involved in their care:    Yes    If yes, list name here:  Arvil Phlegm    Transportation Provider:  Patient             Is patient in Isolation/One on One/Altered Mental Status? No  If yes, skip next question. If no, would they like an I-Pad to  use? NA  If yes, call [phone]. Discharge Plan:  10/6: MEDICARE - Patient is from 2-story home alone. He is independent and drives. DME - None. Declines need for VNS. New AFib - Started Eliquis - Awaiting OOP cost.  PO Cardizem and discharge soon.  //LOLIS                 Electronically signed by: Chaim Larry RN on 10/6/2020 at 12:51 PM

## 2020-10-07 ENCOUNTER — HOSPITAL ENCOUNTER (INPATIENT)
Dept: CARDIAC CATH/INVASIVE PROCEDURES | Age: 65
LOS: 1 days | Discharge: HOME | DRG: 247 | End: 2020-10-08
Attending: INTERNAL MEDICINE | Admitting: INTERNAL MEDICINE
Payer: MEDICARE

## 2020-10-07 VITALS
RESPIRATION RATE: 20 BRPM | HEIGHT: 69 IN | SYSTOLIC BLOOD PRESSURE: 134 MMHG | OXYGEN SATURATION: 95 % | TEMPERATURE: 98.6 F | BODY MASS INDEX: 36.22 KG/M2 | DIASTOLIC BLOOD PRESSURE: 87 MMHG | HEART RATE: 80 BPM | WEIGHT: 244.53 LBS

## 2020-10-07 PROBLEM — I50.20 SYSTOLIC HEART FAILURE (HCC): Status: ACTIVE | Noted: 2020-10-07

## 2020-10-07 PROBLEM — Z95.820 S/P ANGIOPLASTY WITH STENT: Status: ACTIVE | Noted: 2020-10-07

## 2020-10-07 PROBLEM — E78.2 MIXED HYPERLIPIDEMIA: Status: ACTIVE | Noted: 2020-10-07

## 2020-10-07 PROBLEM — E78.5 DYSLIPIDEMIA: Status: RESOLVED | Noted: 2020-10-05 | Resolved: 2020-10-07

## 2020-10-07 LAB
ANION GAP SERPL CALCULATED.3IONS-SCNC: 11 MMOL/L (ref 9–17)
ANTI-XA UNFRAC HEPARIN: 0.3 IU/L (ref 0.3–0.7)
BUN BLDV-MCNC: 21 MG/DL (ref 8–23)
BUN/CREAT BLD: ABNORMAL (ref 9–20)
CALCIUM SERPL-MCNC: 9.3 MG/DL (ref 8.6–10.4)
CHLORIDE BLD-SCNC: 102 MMOL/L (ref 98–107)
CO2: 24 MMOL/L (ref 20–31)
CREAT SERPL-MCNC: 0.85 MG/DL (ref 0.7–1.2)
GFR AFRICAN AMERICAN: >60 ML/MIN
GFR NON-AFRICAN AMERICAN: >60 ML/MIN
GFR SERPL CREATININE-BSD FRML MDRD: ABNORMAL ML/MIN/{1.73_M2}
GFR SERPL CREATININE-BSD FRML MDRD: ABNORMAL ML/MIN/{1.73_M2}
GLUCOSE BLD-MCNC: 129 MG/DL (ref 70–99)
POTASSIUM SERPL-SCNC: 3.9 MMOL/L (ref 3.7–5.3)
SODIUM BLD-SCNC: 137 MMOL/L (ref 135–144)

## 2020-10-07 PROCEDURE — 2060000000 HC ICU INTERMEDIATE R&B

## 2020-10-07 PROCEDURE — C9600 PERC DRUG-EL COR STENT SING: HCPCS | Performed by: INTERNAL MEDICINE

## 2020-10-07 PROCEDURE — C1769 GUIDE WIRE: HCPCS

## 2020-10-07 PROCEDURE — 6370000000 HC RX 637 (ALT 250 FOR IP): Performed by: STUDENT IN AN ORGANIZED HEALTH CARE EDUCATION/TRAINING PROGRAM

## 2020-10-07 PROCEDURE — 7100000001 HC PACU RECOVERY - ADDTL 15 MIN

## 2020-10-07 PROCEDURE — C1725 CATH, TRANSLUMIN NON-LASER: HCPCS

## 2020-10-07 PROCEDURE — 7100000010 HC PHASE II RECOVERY - FIRST 15 MIN

## 2020-10-07 PROCEDURE — 80048 BASIC METABOLIC PNL TOTAL CA: CPT

## 2020-10-07 PROCEDURE — 93458 L HRT ARTERY/VENTRICLE ANGIO: CPT | Performed by: INTERNAL MEDICINE

## 2020-10-07 PROCEDURE — C1874 STENT, COATED/COV W/DEL SYS: HCPCS

## 2020-10-07 PROCEDURE — 6370000000 HC RX 637 (ALT 250 FOR IP): Performed by: INTERNAL MEDICINE

## 2020-10-07 PROCEDURE — B2151ZZ FLUOROSCOPY OF LEFT HEART USING LOW OSMOLAR CONTRAST: ICD-10-PCS | Performed by: INTERNAL MEDICINE

## 2020-10-07 PROCEDURE — 6360000002 HC RX W HCPCS

## 2020-10-07 PROCEDURE — 2580000003 HC RX 258: Performed by: STUDENT IN AN ORGANIZED HEALTH CARE EDUCATION/TRAINING PROGRAM

## 2020-10-07 PROCEDURE — 85520 HEPARIN ASSAY: CPT

## 2020-10-07 PROCEDURE — 4A023N7 MEASUREMENT OF CARDIAC SAMPLING AND PRESSURE, LEFT HEART, PERCUTANEOUS APPROACH: ICD-10-PCS | Performed by: INTERNAL MEDICINE

## 2020-10-07 PROCEDURE — 36415 COLL VENOUS BLD VENIPUNCTURE: CPT

## 2020-10-07 PROCEDURE — 2500000003 HC RX 250 WO HCPCS

## 2020-10-07 PROCEDURE — 6370000000 HC RX 637 (ALT 250 FOR IP)

## 2020-10-07 PROCEDURE — 7100000000 HC PACU RECOVERY - FIRST 15 MIN

## 2020-10-07 PROCEDURE — 2709999900 HC NON-CHARGEABLE SUPPLY

## 2020-10-07 PROCEDURE — 027035Z DILATION OF CORONARY ARTERY, ONE ARTERY WITH TWO DRUG-ELUTING INTRALUMINAL DEVICES, PERCUTANEOUS APPROACH: ICD-10-PCS | Performed by: INTERNAL MEDICINE

## 2020-10-07 PROCEDURE — 7100000011 HC PHASE II RECOVERY - ADDTL 15 MIN

## 2020-10-07 PROCEDURE — B2111ZZ FLUOROSCOPY OF MULTIPLE CORONARY ARTERIES USING LOW OSMOLAR CONTRAST: ICD-10-PCS | Performed by: INTERNAL MEDICINE

## 2020-10-07 PROCEDURE — 6360000004 HC RX CONTRAST MEDICATION

## 2020-10-07 PROCEDURE — C1894 INTRO/SHEATH, NON-LASER: HCPCS

## 2020-10-07 PROCEDURE — C1887 CATHETER, GUIDING: HCPCS

## 2020-10-07 RX ORDER — METOPROLOL SUCCINATE 50 MG/1
50 TABLET, EXTENDED RELEASE ORAL DAILY
Status: DISCONTINUED | OUTPATIENT
Start: 2020-10-07 | End: 2020-10-08 | Stop reason: HOSPADM

## 2020-10-07 RX ORDER — SODIUM CHLORIDE 0.9 % (FLUSH) 0.9 %
10 SYRINGE (ML) INJECTION EVERY 12 HOURS SCHEDULED
Status: DISCONTINUED | OUTPATIENT
Start: 2020-10-07 | End: 2020-10-08 | Stop reason: HOSPADM

## 2020-10-07 RX ORDER — LISINOPRIL 5 MG/1
2.5 TABLET ORAL DAILY
Status: CANCELLED | OUTPATIENT
Start: 2020-10-07

## 2020-10-07 RX ORDER — ACETAMINOPHEN 325 MG/1
650 TABLET ORAL EVERY 4 HOURS PRN
Status: DISCONTINUED | OUTPATIENT
Start: 2020-10-07 | End: 2020-10-08 | Stop reason: HOSPADM

## 2020-10-07 RX ORDER — ASPIRIN 81 MG/1
81 TABLET, CHEWABLE ORAL DAILY
Status: DISCONTINUED | OUTPATIENT
Start: 2020-10-08 | End: 2020-10-08 | Stop reason: HOSPADM

## 2020-10-07 RX ORDER — CLOPIDOGREL BISULFATE 75 MG/1
75 TABLET ORAL DAILY
Status: DISCONTINUED | OUTPATIENT
Start: 2020-10-08 | End: 2020-10-08 | Stop reason: HOSPADM

## 2020-10-07 RX ORDER — ATORVASTATIN CALCIUM 80 MG/1
80 TABLET, FILM COATED ORAL NIGHTLY
Status: DISCONTINUED | OUTPATIENT
Start: 2020-10-07 | End: 2020-10-08 | Stop reason: HOSPADM

## 2020-10-07 RX ORDER — SODIUM CHLORIDE 0.9 % (FLUSH) 0.9 %
10 SYRINGE (ML) INJECTION PRN
Status: DISCONTINUED | OUTPATIENT
Start: 2020-10-07 | End: 2020-10-08 | Stop reason: HOSPADM

## 2020-10-07 RX ADMIN — MULTIPLE VITAMINS W/ MINERALS TAB 1 TABLET: TAB at 09:36

## 2020-10-07 RX ADMIN — SACUBITRIL AND VALSARTAN 1 TABLET: 24; 26 TABLET, FILM COATED ORAL at 09:36

## 2020-10-07 RX ADMIN — ATORVASTATIN CALCIUM 80 MG: 80 TABLET, FILM COATED ORAL at 22:07

## 2020-10-07 RX ADMIN — Medication 10 ML: at 22:08

## 2020-10-07 RX ADMIN — APIXABAN 5 MG: 5 TABLET, FILM COATED ORAL at 22:07

## 2020-10-07 RX ADMIN — METOPROLOL SUCCINATE 150 MG: 100 TABLET, EXTENDED RELEASE ORAL at 09:36

## 2020-10-07 ASSESSMENT — ENCOUNTER SYMPTOMS
ABDOMINAL PAIN: 0
DIARRHEA: 0
ABDOMINAL DISTENTION: 0
VOMITING: 0
EYES NEGATIVE: 1
ALLERGIC/IMMUNOLOGIC NEGATIVE: 1
NAUSEA: 0
BLOOD IN STOOL: 0
CHEST TIGHTNESS: 0
SHORTNESS OF BREATH: 0
CONSTIPATION: 0

## 2020-10-07 ASSESSMENT — PAIN SCALES - GENERAL
PAINLEVEL_OUTOF10: 0
PAINLEVEL_OUTOF10: 0

## 2020-10-07 NOTE — PROGRESS NOTES
Physical Therapy  DATE: 10/7/2020    NAME: Cathy Kim  MRN: [id number]   : 1955    Patient not seen this date for Physical Therapy due to:  [] Blood transfusion in progress  [] Hemodialysis  []  Patient Declined  [] Spine Precautions   [] Strict Bedrest  [x] Surgery/ Procedure Cx, pt getting ready to leave for Cardiac Cath at [facility] [address]. V's, will try back tomorrow. [] Testing      [] Other        [] PT being discontinued at this time. Patient independent. No further needs. [] PT being discontinued at this time as the patient has been transferred to palliative care. No further needs.     [employer], PTA

## 2020-10-07 NOTE — PROGRESS NOTES
[facility]   Progress Note                   Date:   10/7/2020  Patient name: Thais Damian  Date of admission:  10/5/2020  3:37 PM  MRN:   [id number]  YOB: 1955  PCP: ANGELITO Peters CNP    Reason for Admission:     Subjective:       Clinical Changes / Abnormalities: seen and examined. No cp, sob, orthopnea, pnd, le edema. EF is low, discharge on hold until cardiac cath. Medications:   Scheduled Meds:   metoprolol succinate  150 mg Oral Daily    sacubitril-valsartan  1 tablet Oral BID    therapeutic multivitamin-minerals  1 tablet Oral Daily    sodium chloride flush  10 mL Intravenous 2 times per day     Continuous Infusions:   heparin (PORCINE) Infusion 9 Units/kg/hr (10/07/20 0736)     CBC:   Recent Labs     10/05/20  1605   WBC 6.8   HGB 14.5        BMP:    Recent Labs     10/05/20  1605 10/06/20  0349 10/07/20  0539    135 137   K 4.2 3.7 3.9    101 102   CO2 23 24 24   BUN 19 17 21   CREATININE 0.84 0.75 0.85   GLUCOSE 98 114* 129*     Hepatic:   Recent Labs     10/05/20  1605   AST 37   ALT 30   BILITOT 0.85   ALKPHOS 125     Troponin:   Recent Labs     10/05/20  1605 10/06/20  0824   TROPHS 38* 30*     BNP: No results for input(s): BNP in the last 72 hours. Lipids:   Recent Labs     10/05/20  1609   CHOL 194   HDL 68     INR:   Recent Labs     10/05/20  1605 10/06/20  0349   INR 1.1 1.1       Objective:   Vitals: /76   Pulse 80   Temp 98.6 °F (37 °C)   Resp 20   Ht 5' 9\" (1.753 m)   Wt 244 lb 8.4 oz (110.9 kg)   SpO2 97%   BMI 36.11 kg/m²   General appearance: alert and cooperative with exam  HEENT: Head: Normocephalic, no lesions, without obvious abnormality.   Neck: no adenopathy, no carotid bruit, no JVD, supple, symmetrical, trachea midline and thyroid not enlarged, symmetric, no tenderness/mass/nodules  Lungs: clear to auscultation bilaterally  Heart: irreg regular rate and rhythm, S1, S2 normal, no murmur, click, rub or gallop  Abdomen: soft, non-tender; bowel sounds normal; no masses,  no organomegaly  Extremities: extremities normal, atraumatic, no cyanosis or edema  Neurologic: Mental status: Alert, oriented, thought content appropriate    EKG:   Results for orders placed or performed during the hospital encounter of 10/05/20   EKG 12 Lead   Result Value Ref Range    Ventricular Rate 104 BPM    Atrial Rate 93 BPM    QRS Duration 96 ms    Q-T Interval 344 ms    QTc Calculation (Bazett) 452 ms    R Axis 28 degrees    T Axis 29 degrees    Narrative    Atrial fibrillation with rapid ventricular response with premature ventricular or aberrantly conducted complexes  Cannot rule out Anterior infarct , age undetermined  Abnormal ECG  No previous ECGs available     ECHO:   Results for orders placed or performed during the hospital encounter of 10/05/20   ECHO Complete 2D W Doppler W Color    Narrative    [facility]    Transthoracic Echocardiography Report (TTE)     Patient Name Han Holloway       Date of Study               10/06/2020                LASHELL      Date of      1955  Gender                      Male   Birth      Age          77 year(s)  Race                              Room Number  2087        Height:                     69 inch, 175.26 cm      Corporate ID [id number]    Weight:                     244 pounds, 110.7 kg   #      Patient Acct [de-identified]   BSA:          2.25 m^2      BMI:      36.03   #                                                              kg/m^2      MR #         [id number]      Sonographer                 Fernanda Maxwell      Accession #  [id number]  Interpreting Physician      [address] [place] [address]      Fellow                   Referring Nurse                            Practitioner      Interpreting             Referring Physician         Caro Almaguer MD   Fellow     Type of Study      TTE procedure:2D Echocardiogram, M-Mode, Doppler, Color Doppler.      Procedure Date  Date: 10/06/2020 Start: 09:01 AM    Study Location: [address] [place] [address]  Technical Quality: Fair visualization    Indications:Atrial fibrillation. History / Tech. Comments:  No relevant history. Patient Status: Inpatient    Height: 69 inches Weight: 244.01 pounds BSA: 2.25 m^2 BMI: 36.03 kg/m^2    Rhythm: Atrial fibrillation HR: 74 bpm BP: 117/98 mmHg    CONCLUSIONS    Summary  Left ventricle is at upper limits of normal size. Mild left ventricular  hypertrophy. Left ventricular systolic function is moderately reduced. Calculated EF via  Jacobson's method is 32 %. Both atria are mildly dilated. Normal right ventricular size and function. Mild mitral regurgitation. Mild tricuspid regurgitation. Mild pulmonary hypertension. Estimated right ventricular systolic pressure  is 03YHVA. Aortic root is mildly dilated @ 4cm. Signature  ----------------------------------------------------------------------------   Electronically signed by Fernanda Maxwell(Sonographer) on 10/06/2020 12:43   PM  ----------------------------------------------------------------------------    ----------------------------------------------------------------------------   Electronically signed by Hima WilsonInterpreting physician) on 10/06/2020   01:10 PM  ----------------------------------------------------------------------------  FINDINGS  Left Atrium  Left atrium is mildly dilated. Left Ventricle  Left ventricle is at upper limits of normal size. Mild left ventricular hypertrophy. Left ventricular systolic function is moderately reduced. Calculated EF via Jacobson's method is 32 %. Right Atrium  Right atrium is mildly dilated . Right Ventricle  Normal right ventricular size and function. Mitral Valve  No obvious valvular abnormality seen. Mild mitral regurgitation. Aortic Valve  No obvious valvular abnormality seen. No evidence of aortic insufficiency or stenosis. Tricuspid Valve  No obvious valvular abnormality.   Mild tricuspid

## 2020-10-07 NOTE — OP NOTE
Balloon 2.0mm x 12mm. 1 inflation(s) to a max pressure of:    8 jayy. - Xience Zeenat 3.0 x 15 MASOUD. 1 inflation(s) to a max pressure of: 12    jayy. - Xience Zeenat 3.0 x 23 MASOUD. 1 inflation(s) to a max pressure of: 10    jayy. Lesion on Mid LAD: Mid subsection. 90% stenosis 12 mm length reduced to    0%. Pre procedure LUIS III flow was noted. Post Procedure LUIS III flow    was present. Good runoff was present. The lesion was diagnosed as    Moderate Risk (B). LCx: 50% mid stenosis     RCA: 40-50% mid stenosis      Coronary Tree      Dominance: Right     LV Analysis  LV function assessed as:Abnormal.  Ejection Fraction: 30%         Conclusions:   LAD 70% mid stenosis reduced to 0% using 3x23 mmXience stent, 90% stenosis   more distal in mid segment reduced to 0% using 3x15 mm Xience stent. Nonobstructive disease of other arteries. Moderately to severely reduced LV systolic function. Recommendations:   DAPT and risk factor modifications. Reassess EF in 3 months. History and Risk Factors    [x] Hypertension     [] Family history of CAD  [x] Hyperlipidemia     [] Cerebrovascular Disease   [] Prior MI       [] Peripheral Vascular disease   [] Prior PCI              [] Diabetes Mellitus    [] Left Main PCI. [] Currently on Dialysis. [] Prior CABG. [] Currently smoker. [] Cardiac Arrest outside of healthcare facility. [] Yes    [x] No        Witnessed     [] Yes   [] No     Arrest after arrival of EMS  [] Yes   [] No     [] Cardiac Arrest at other Facility. [] Yes   [] No    Pre-Procedure Information. Heart Failure       [x] Yes    [] No        Class  [] I      [] II  [] III    [] IV. New Diagnosis    [x] Yes  [] No    HF Type      [x] Systolic   [] Diastolic          [] Unknown.     Diagnostic Test:   EKG       [] Normal   [x] Abnormal    New antiarrhythmia medications:    [] Yes   [] No   New onset atrial fibrillation / Flutter     [] Yes   [] No   ECG Abnormalities:      [] V. Fib   [] Kelsi V. Tach           [] NS V. T   [] New LBBB           [] T. Inv  []  ST dev > 0.5 mm         [] PVC's freq  [] PVC's infrequent    Stress Test Performed:      [] Yes    [x] No     Type:     [] Stress Echo   [] Exercise Stress Test (no imaging)      [] Stress Nuclear  [] Stress Imaging     Results   [] Negative   [] Positive        [] Indeterminate  [] Unavailable     If Positive/ Risk / Extent of Ischemia:       [] Low  [] Intermediate         [] High  [] Unavailable      Cardiac CTA Performed:     [] Yes    [x] No      Results   [] CAD   [] Non obstructive CAD      [] No CAD   [] Uncertain      [] Unknown   [] Structural Disease. Pre Procedure Medications:   [] Yes    [x] No         [] ASA  [] Beta Blockers      [] Nitrate  [] Ca Channel Blockers      [] Ranolazine  [] Statin       [] Plavix/Others antiplatelets          Agus Carter MD  Fellow, [address]. Bess Galloway MD, Attending Physician. [facility].

## 2020-10-07 NOTE — PROGRESS NOTES
Patient admitted, consent signed and questions answered. Patient ready for procedure. Call light to reach with side rails up 2 of 2. Bilateral groin areas clipped. No family at bedside with patient. History and physical complete.

## 2020-10-07 NOTE — PLAN OF CARE
Problem: Infection:  Goal: Will remain free from infection  Description: Will remain free from infection  Outcome: Met This Shift     Problem: Safety:  Goal: Free from accidental physical injury  Description: Free from accidental physical injury  Outcome: Met This Shift  Goal: Free from intentional harm  Description: Free from intentional harm  Outcome: Met This Shift     Problem: Daily Care:  Goal: Daily care needs are met  Description: Daily care needs are met  Outcome: Met This Shift     Problem: Pain:  Goal: Patient's pain/discomfort is manageable  Description: Patient's pain/discomfort is manageable  Outcome: Met This Shift     Problem: Skin Integrity:  Goal: Skin integrity will stabilize  Description: Skin integrity will stabilize  Outcome: Met This Shift     Problem: Discharge Planning:  Goal: Patients continuum of care needs are met  Description: Patients continuum of care needs are met  Outcome: Met This Shift     Problem: Musculor/Skeletal Functional Status  Goal: Highest potential functional level  Outcome: Met This Shift

## 2020-10-07 NOTE — CARE COORDINATION
ONGOING DISCHARGE PLAN:    Spoke with patient regarding discharge plan and patient confirms that plan is still to DC to home w/ no need for VNS, at this time. Pt. Is getting ready to go to [facility]. V's For Cardiac Cath. Unsure if he will return. Per Cardio notes, Pt. Will DC on Eliquis, Bid. Previous DC planner called in Xarelto, w/ Cost of $482, which pt. Was agreeable to for he did not want Coumadin. Writer gave 30 Day Free card for Eliquis & still enc pt. To F/U w/ Cardio in office for samples. Will follow for Eliquis cost if/when pt. Returns. Will continue to follow for additional discharge needs.     Electronically signed by Nisa Hopper RN on 10/7/2020 at 10:04 AM

## 2020-10-07 NOTE — PROGRESS NOTES
Patient not in the room has been sent to [facility] for cardiac cath  Laura Allison  10/7/2020

## 2020-10-07 NOTE — PROGRESS NOTES
Rn educated and gave the patient education handouts on entresto, toprol XL, and cardiac cath. RN asked the patient if he had any question, he said no. RN also asked if he would like RN to call anyone and them them know he is going to [address] [place] [address]. V's and  He said no.

## 2020-10-07 NOTE — H&P
[facility]  Pre- Procedure History and Physical/Update          Patient Name:  Sri Hillman  MRN:    [id number]  YOB: 1955  Date of evaluation:  10/7/2020       Please refer to the consult note / H&P completed by Dr. Juan Pablo Baker on  10/6/20  in the medical record and note that:       [] I have examined the patient and reviewed the H&P/Consult and there are no changes to be made to the assessment or plan. [x] I have examined the patient and reviewed the H&P/Consult and have noted the following changes:          Echo: 10/6/20  Left ventricle is at upper limits of normal size. Mild left ventricular  hypertrophy. Left ventricular systolic function is moderately reduced. Calculated EF via  Jacobson's method is 32 %. Both atria are mildly dilated. Normal right ventricular size and function. Mild mitral regurgitation. Mild tricuspid regurgitation. Mild pulmonary hypertension. Estimated right ventricular systolic pressure  is 14DKAC. Aortic root is mildly dilated @ 4cm      No past medical history on file. Past Surgical History:   Procedure Laterality Date    CARDIAC CATHETERIZATION  10/07/2020    COLONOSCOPY      TONSILLECTOMY          reports that he quit smoking about 45 years ago. His smoking use included cigarettes. He has a 15.00 pack-year smoking history. He has never used smokeless tobacco. He reports previous alcohol use. He reports that he does not use drugs. Prior to Admission medications    Medication Sig Start Date End Date Taking?  Authorizing Provider   naproxen sodium (ANAPROX) 220 MG tablet Take 220 mg by mouth 2 times daily (with meals)   Yes Historical Provider, MD   dilTIAZem (CARDIZEM CD) 240 MG extended release capsule Take 1 capsule by mouth daily 10/7/20 11/6/20  Tio Qureshi MD   Multiple Vitamins-Minerals (THERAPEUTIC MULTIVITAMIN-MINERALS) tablet Take 1 tablet by mouth daily    Historical Provider, MD       Allergies   Allergen Reactions    No Known Allergies          REVIEW OF SYSTEMS:     A detailed review of system was performed as already noted and is otherwise as above. PHYSICAL EXAM:     Vitals:    10/07/20 1123   Pulse: 68   Resp: 20   Temp: 98.3 °F (36.8 °C)        Constitutional and General Appearance: alert, cooperative, no distress and appears stated age  [de-identified]: PERRL, no cervical lymphadenopathy. No masses palpable. Normal oral mucosa  Respiratory:  · Normal excursion and expansion without use of accessory muscles  · Resp Auscultation: Good respiratory effort. No for increased work of breathing. On auscultation: clear to auscultation bilaterally  Cardiovascular:  · The apical impulse is not displaced  · Heart tones are crisp and normal. irregular S1 and S2.  · Jugular venous pulsation Normal  · The carotid upstroke is normal in amplitude and contour without delay or bruit  · Peripheral pulses are symmetrical and full  Abdomen:  · No masses or tenderness  · Bowel sounds present  Extremities:  ·  No Cyanosis or Clubbing  ·  Lower extremity edema: No  · Skin: Warm and dry  Neurological:  · Alert and oriented. · Moves all extremities well  · No abnormalities of mood, affect, memory, mentation, or behavior are noted      Active Problems:    * No active hospital problems. *  Resolved Problems:    * No resolved hospital problems. *      Assessment:  1. Acute systolic heart failure, LVEF 32%  2. New onset Atrial Fibrillation with RVR  3. HTN  4. HLD  5. Suspected LARRY      Plan:  1. Proceed with planned cardiac cath . 2. Further orders to follow. The risks, benefits, and alternatives of the prcoedure were discussed in detail with the patient. The patient agrees to proceed with the procedure, verbalizes understanding and signed consent.        Brandy Navarro MD  Fellow, [facility] [place] [facility]

## 2020-10-07 NOTE — PROGRESS NOTES
[address]    PROGRESS NOTE             10/7/2020    12:42 PM    Name:   Michael Yoder  MRN:     [id number]     [place]:      [de-identified]   Room:   2087/2087-01   Day:  2  Admit Date:  10/5/2020  3:37 PM    PCP:  ANGELITO Rojas CNP  Code Status:  Full Code    Subjective:     C/C:   Chief Complaint   Patient presents with    Palpitations    Shortness of Breath    Hypertension     Interval History Status: improved. Pt was seen and examined by the bedside. Pt was sitting in the bed. Pt was on heparin gtt. He was NPO from midnight as cardiology wants to do a cath. Pt was informed about the process and he asked question about the process. Pt did not have any concern at the moment. Pt denies HA, fever, chills, nausea, vomiting, CP, racing of heart, shortness of breathe, abdominal pain. Brief History:     Please see H & P    Review of Systems:     Review of Systems   Constitutional: Negative for chills and fatigue. HENT: Negative. Eyes: Negative. Respiratory: Negative for chest tightness and shortness of breath. Cardiovascular: Negative for chest pain, palpitations and leg swelling. Gastrointestinal: Negative for abdominal distention, abdominal pain, blood in stool, constipation, diarrhea, nausea and vomiting. Endocrine: Negative. Genitourinary: Negative for difficulty urinating, hematuria and urgency. Musculoskeletal: Negative. Skin: Negative. Allergic/Immunologic: Negative. Neurological: Negative. Hematological: Negative. Psychiatric/Behavioral: Negative. Medications: Allergies:     Allergies   Allergen Reactions    No Known Allergies        Current Meds:   Scheduled Meds:    metoprolol succinate  150 mg Oral Daily    sacubitril-valsartan  1 tablet Oral BID    therapeutic multivitamin-minerals  1 tablet Oral Daily    sodium chloride flush  10 mL Intravenous 2 times per sounds. Pulmonary:      Effort: Pulmonary effort is normal.      Breath sounds: Normal breath sounds. Abdominal:      General: There is no distension. Palpations: Abdomen is soft. There is no mass. Tenderness: There is no abdominal tenderness. There is no guarding. Hernia: No hernia is present. Assessment:        Primary Problem  Atrial fibrillation with RVR [facility])    Active Hospital Problems    Diagnosis Date Noted    CHF (congestive heart failure) ([facility] [address] [I50.9] 10/06/2020    [facility] [I48.91] 10/05/2020    Hypertension [I10] 10/05/2020       Plan:        Afib with RVR:   -EKG showed Atrial fibrillation with rapid ventricular response with premature ventricular.  -Troponin 38----> 30   -Pro-BNP 2712  -D-Dimer: 0.50  -CXR: showed boot-shaped heart  -TSH: 3.07  -Mg; 2.1 K: 4.2  -Echo showed EF of 32% with, dilation of both atria with mild MR and TR. Mild pulmonary HTN 49 mm Hg with Aortic root dilation @ 4cm  -Appreciate cardiology recs    DISPO: Pt went to cath lab at [facility]. Cardiology recommended Entresto 26-26 mg and Toprol  150 mg PO qd. Pt insurance does not cover eliquis so Xarelto 20 mg daily for 3 months is prescribed and sent to pharmacy under cardiologist name.         Kurt Fernandez MD  10/7/2020  12:42 PM

## 2020-10-08 VITALS
RESPIRATION RATE: 15 BRPM | OXYGEN SATURATION: 100 % | TEMPERATURE: 98.4 F | BODY MASS INDEX: 36.14 KG/M2 | WEIGHT: 244 LBS | DIASTOLIC BLOOD PRESSURE: 87 MMHG | HEART RATE: 73 BPM | HEIGHT: 69 IN | SYSTOLIC BLOOD PRESSURE: 129 MMHG

## 2020-10-08 LAB
ANION GAP SERPL CALCULATED.3IONS-SCNC: 10 MMOL/L (ref 9–17)
BUN BLDV-MCNC: 21 MG/DL (ref 8–23)
BUN/CREAT BLD: ABNORMAL (ref 9–20)
CALCIUM SERPL-MCNC: 9 MG/DL (ref 8.6–10.4)
CHLORIDE BLD-SCNC: 103 MMOL/L (ref 98–107)
CHOLESTEROL/HDL RATIO: 3
CHOLESTEROL: 188 MG/DL
CO2: 24 MMOL/L (ref 20–31)
CREAT SERPL-MCNC: 0.89 MG/DL (ref 0.7–1.2)
EKG ATRIAL RATE: 375 BPM
EKG Q-T INTERVAL: 422 MS
EKG QRS DURATION: 108 MS
EKG QTC CALCULATION (BAZETT): 448 MS
EKG R AXIS: 24 DEGREES
EKG T AXIS: 37 DEGREES
EKG VENTRICULAR RATE: 68 BPM
GFR AFRICAN AMERICAN: >60 ML/MIN
GFR NON-AFRICAN AMERICAN: >60 ML/MIN
GFR SERPL CREATININE-BSD FRML MDRD: ABNORMAL ML/MIN/{1.73_M2}
GFR SERPL CREATININE-BSD FRML MDRD: ABNORMAL ML/MIN/{1.73_M2}
GLUCOSE BLD-MCNC: 114 MG/DL (ref 70–99)
HCT VFR BLD CALC: 42.2 % (ref 40.7–50.3)
HDLC SERPL-MCNC: 63 MG/DL
HEMOGLOBIN: 14.4 G/DL (ref 13–17)
LDL CHOLESTEROL: 112 MG/DL (ref 0–130)
MAGNESIUM: 2 MG/DL (ref 1.6–2.6)
MCH RBC QN AUTO: 33.1 PG (ref 25.2–33.5)
MCHC RBC AUTO-ENTMCNC: 34.1 G/DL (ref 28.4–34.8)
MCV RBC AUTO: 97 FL (ref 82.6–102.9)
NRBC AUTOMATED: 0 PER 100 WBC
PDW BLD-RTO: 12.8 % (ref 11.8–14.4)
PLATELET # BLD: 181 K/UL (ref 138–453)
PMV BLD AUTO: 10.7 FL (ref 8.1–13.5)
POTASSIUM SERPL-SCNC: 3.5 MMOL/L (ref 3.7–5.3)
RBC # BLD: 4.35 M/UL (ref 4.21–5.77)
SODIUM BLD-SCNC: 137 MMOL/L (ref 135–144)
TRIGL SERPL-MCNC: 65 MG/DL
TROPONIN INTERP: ABNORMAL
TROPONIN T: ABNORMAL NG/ML
TROPONIN, HIGH SENSITIVITY: 28 NG/L (ref 0–22)
VLDLC SERPL CALC-MCNC: NORMAL MG/DL (ref 1–30)
WBC # BLD: 10.4 K/UL (ref 3.5–11.3)

## 2020-10-08 PROCEDURE — 80061 LIPID PANEL: CPT

## 2020-10-08 PROCEDURE — 36415 COLL VENOUS BLD VENIPUNCTURE: CPT

## 2020-10-08 PROCEDURE — 80048 BASIC METABOLIC PNL TOTAL CA: CPT

## 2020-10-08 PROCEDURE — 6370000000 HC RX 637 (ALT 250 FOR IP): Performed by: STUDENT IN AN ORGANIZED HEALTH CARE EDUCATION/TRAINING PROGRAM

## 2020-10-08 PROCEDURE — 6370000000 HC RX 637 (ALT 250 FOR IP): Performed by: NURSE PRACTITIONER

## 2020-10-08 PROCEDURE — 83735 ASSAY OF MAGNESIUM: CPT

## 2020-10-08 PROCEDURE — 2580000003 HC RX 258: Performed by: STUDENT IN AN ORGANIZED HEALTH CARE EDUCATION/TRAINING PROGRAM

## 2020-10-08 PROCEDURE — 93005 ELECTROCARDIOGRAM TRACING: CPT | Performed by: INTERNAL MEDICINE

## 2020-10-08 PROCEDURE — 93010 ELECTROCARDIOGRAM REPORT: CPT | Performed by: INTERNAL MEDICINE

## 2020-10-08 PROCEDURE — 84484 ASSAY OF TROPONIN QUANT: CPT

## 2020-10-08 PROCEDURE — 85027 COMPLETE CBC AUTOMATED: CPT

## 2020-10-08 RX ORDER — NITROGLYCERIN 0.4 MG/1
0.4 TABLET SUBLINGUAL EVERY 5 MIN PRN
Qty: 25 TABLET | Refills: 3 | Status: SHIPPED | OUTPATIENT
Start: 2020-10-08

## 2020-10-08 RX ORDER — POTASSIUM CHLORIDE 20 MEQ/1
40 TABLET, EXTENDED RELEASE ORAL ONCE
Status: COMPLETED | OUTPATIENT
Start: 2020-10-08 | End: 2020-10-08

## 2020-10-08 RX ORDER — METOPROLOL SUCCINATE 50 MG/1
50 TABLET, EXTENDED RELEASE ORAL DAILY
Qty: 30 TABLET | Refills: 3 | Status: SHIPPED | OUTPATIENT
Start: 2020-10-09

## 2020-10-08 RX ORDER — CLOPIDOGREL BISULFATE 75 MG/1
75 TABLET ORAL DAILY
Qty: 30 TABLET | Refills: 3 | Status: SHIPPED | OUTPATIENT
Start: 2020-10-09

## 2020-10-08 RX ORDER — ATORVASTATIN CALCIUM 80 MG/1
80 TABLET, FILM COATED ORAL NIGHTLY
Qty: 30 TABLET | Refills: 3 | Status: SHIPPED | OUTPATIENT
Start: 2020-10-08

## 2020-10-08 RX ORDER — ASPIRIN 81 MG/1
81 TABLET, CHEWABLE ORAL DAILY
Qty: 30 TABLET | Refills: 3 | Status: SHIPPED | OUTPATIENT
Start: 2020-10-09

## 2020-10-08 RX ADMIN — APIXABAN 5 MG: 5 TABLET, FILM COATED ORAL at 08:51

## 2020-10-08 RX ADMIN — POTASSIUM CHLORIDE 40 MEQ: 1500 TABLET, EXTENDED RELEASE ORAL at 13:39

## 2020-10-08 RX ADMIN — ASPIRIN 81 MG: 81 TABLET, CHEWABLE ORAL at 08:52

## 2020-10-08 RX ADMIN — CLOPIDOGREL 75 MG: 75 TABLET, FILM COATED ORAL at 08:52

## 2020-10-08 RX ADMIN — Medication 10 ML: at 08:56

## 2020-10-08 RX ADMIN — METOPROLOL SUCCINATE 50 MG: 50 TABLET, FILM COATED, EXTENDED RELEASE ORAL at 08:52

## 2020-10-08 RX ADMIN — SACUBITRIL AND VALSARTAN 1 TABLET: 24; 26 TABLET, FILM COATED ORAL at 08:52

## 2020-10-08 ASSESSMENT — PAIN SCALES - GENERAL: PAINLEVEL_OUTOF10: 0

## 2020-10-08 NOTE — PROGRESS NOTES
Pt Hr was NSR after cath on telemetry strips. At 530 am pt appear to have a rhythm change. 12 lead showed afib. Pt morning K=3.5. Writer contacted cardiology. Per Fellow pt will be seen following.

## 2020-10-08 NOTE — CARE COORDINATION
Patient/family seen: Yes       Informed patient/family of BPCI-A Medical Bundle Program with potential outreach by either Care Transitions Team or naviHealth Team based on hospital admission and location.        BPCI-A Notification Letter given: Yes         Current discharge plan: home alone

## 2020-10-08 NOTE — PROGRESS NOTES
Pt cath  Site checked  and pulses palpable q 4 with vitals. No signs of hematoma bleeding or complications.

## 2020-10-08 NOTE — CARE COORDINATION
Case Management Initial Discharge Plan  Jackye Favre,             Met with:patient to discuss discharge plans. Information verified: address, contacts, phone number, , insurance Yes    Emergency Contact/Next of Kin name & number: Ex wife Nathaniel Staley [phone]    PCP: No primary care provider on file. Insurance Provider: Medicare/[employer]etna    Discharge Planning    Living Arrangements:  Alone   Support Systems:  Spouse/Significant Other, Family Members    Home has 2 stories   15stairs to climb to reach second floor  Location of bedroom/bathroom in home upstairs    Patient able to perform ADL's:Independent    Current Services (outpatient & in home) none  DME equipment: none  DME provider: n/a    Receiving oral anticoagulation therapy? Yes eliquis    If indicated:   Physician managing anticoagulation treatment:   Where does patient obtain lab work for ATC treatment? Potential Assistance Needed:  N/A    Patient agreeable to home care: No  [place] of choice provided:  n/a    Prior SNF/Rehab Placement and Facility: N/A  Agreeable to SNF/Rehab: No  [place] of choice provided: n/a     Evaluation: n/a    Expected Discharge date:  10/08/20    Patient expects to be discharged to:  home  Follow Up Appointment: Best Day/ Time: Monday AM    Transportation provider: needs ride to [facility] arrangements needed for discharge: Yes    Readmission Risk              Risk of Unplanned Readmission:        7             Does patient have a readmission risk score greater than 14?: No  If yes, follow-up appointment must be made within 7 days of discharge.      Goals of Care: no chest pain      Discharge Plan: home alone    Pharmacy-[facility] in [place]          Electronically signed by Gissel De Paz RN on 10/8/20 at 9:03 AM EDT

## 2020-10-08 NOTE — DISCHARGE SUMMARY
[facility]  Discharge Note                 Name:  Melissa Coyle  YOB: 1955  Social Security Number:  [id number]  Medical Record Number:  [id number]    Date of Admission:  10/7/2020  Date of Discharge:  10/8/2020    Admitting physician: Alfred Mohan MD    Discharge Attending: Evan Marquis CNP  Primary Care Physician: No primary care provider on file. Consultants: Cardiology  Discharge to [address] [place] [address] LIST:  Patient Active Problem List   Diagnosis    Hypertension    Has been smoking tobacco for 30 years or more    Irregular heartbeat    Atypical chest pain    Palpitations    [facility] onset [facility] fibrillation ([facility] [address]    Neoplasm of bladder    Testicular hypofunction    CHF (congestive heart failure) ([facility] [address]    S/P angioplasty with stent    Systolic heart failure (HCC)(LVEF 32%)    Mixed hyperlipidemia         Procedures:cardiac catheterization, echocardiogram    HOSPITAL COURSE :           The patient was admitted for: CHF, Afib   Hospital Procedures if any: Cardiac cath, echo   Medications changes recommendation:  See list   Follow Up Plan:  2 weeks     CARDIAC CATH 10/7/20  Findings:  LMCA: Mild irregularities 10-20%.     LAD: 70% mid stenosis reduced to 0% using 3x23 mmXience stent, 90% stenosis  more distal in mid segment reduced to 0% using 3x15 mm Xience stent.       Lesion on Mid LAD: Mid subsection. 70% stenosis 15 mm length reduced to    0%. Pre procedure LUIS III flow was noted. Post Procedure LUIS III flow    was present. Good runoff was present. The lesion was diagnosed as    Moderate Risk (B).      Devices used       - Runthrough NS. Number of passes: 1.       - Mini Trek Balloon 2.0mm x 12mm. 1 inflation(s) to a max pressure of:    8 jayy.       - Xience Zeenat 3.0 x 15 MASOUD. 1 inflation(s) to a max pressure of: 12    jayy.       - Xience Zeenat 3.0 x 23 MASOUD.  1 inflation(s) to a max pressure of: 10    jayy.       Lesion on Mid LAD: Mid subsection. 90% stenosis 12 mm length reduced to    0%. Pre procedure LUIS III flow was noted. Post Procedure LUIS III flow    was present. Good runoff was present. The lesion was diagnosed as    Moderate Risk (B).    LCx: 50% mid stenosis     RCA: 40-50% mid stenosis      Coronary Tree      Dominance: Right     LV Analysis  LV function assessed as:Abnormal.  Ejection Fraction: 30%           Conclusions:   LAD 70% mid stenosis reduced to 0% using 3x23 mmXience stent, 90% stenosis   more distal in mid segment reduced to 0% using 3x15 mm Xience stent.   Nonobstructive disease of other arteries.   Moderately to severely reduced LV systolic function.     Recommendations:   DAPT and risk factor modifications.   Reassess EF in 3 months. Echo: 10/6/20  Left ventricle is at upper limits of normal size. Mild left ventricular  hypertrophy. Left ventricular systolic function is moderately reduced. Calculated EF via  Jacobson's method is 32 %. Both atria are mildly dilated. Normal right ventricular size and function. Mild mitral regurgitation. Mild tricuspid regurgitation. Mild pulmonary hypertension. Estimated right ventricular systolic pressure  is 50HNUO. Aortic root is mildly dilated @ 4cm  Discharge exam:   Vitals:    10/08/20 1235   BP: 113/65   Pulse: 70   Resp: 16   Temp: 98.4 °F (36.9 °C)   SpO2: 96%     Neuro: normal  Chest: Clear to ausculation. No wheezing. Cardiac: Regular rate. s1 and s2 auscultated. No murmur noted. Abdomen/groin: soft, non-tender, without masses or organomegaly, radial site soft. No drainage, no hematoma,  Lower extremity edema: none     Follow up with primary care provider 1 week  Follow up with cardiology 4 weeks  Follow up with other consultant physicians at their directions.     Discharge Medications:   [facility] Medication Instructions QLV:[id number]    Printed on:10/08/20 1248   Medication Information                      aspirin 81 MG chewable tablet  Take 1 tablet by mouth daily             atorvastatin (LIPITOR) 80 MG tablet  Take 1 tablet by mouth nightly             clopidogrel (PLAVIX) 75 MG tablet  Take 1 tablet by mouth daily             metoprolol succinate (TOPROL XL) 50 MG extended release tablet  Take 1 tablet by mouth daily             Multiple Vitamins-Minerals (THERAPEUTIC MULTIVITAMIN-MINERALS) tablet  Take 1 tablet by mouth daily             nitroGLYCERIN (NITROSTAT) 0.4 MG SL tablet  Place 1 tablet under the tongue every 5 minutes as needed for Chest pain up to max of 3 total doses. If no relief after 1 dose, call 911. rivaroxaban (XARELTO) 20 MG TABS tablet  Take 1 tablet by mouth daily (with breakfast)             sacubitril-valsartan (ENTRESTO) 24-26 MG per tablet  Take 1 tablet by mouth 2 times daily                    Coronary Discharge Core Measure: Please indicate the medication given by X, and if not the reasons not given:    Not Given Reason  Given      Beta Blockers X     On entresto  ACE-I       Statins X      ASA X       OAP (Plavix/Effient/Brilinta) X    SL Nitro   x   Pt admitted for Acute systolic HF and new onset afib. Transferred to 's for cardiac cath. Cardiomyopathy with CAD. Will order LifeVest.      Pt seen and examined. Pt resting in bed. He denies any chest pain or shortness of breath. Radial site soft. AFib, rate stable  Discussed with patient and nursing. Medications and discharge instructions reviewed with patient and nursing. Pt verbalizes understanding regarding medications and activity restrictions. Script for plavix given. Will follow up in 2 weeks.   Will plan to repeat ECHO in 90 days to re-evaluate EF       Electronically signed by ANGELITO Doyle CNP on 10/8/2020 at 12:48 [facility]      [phone]

## 2020-10-08 NOTE — PROGRESS NOTES
Congestive Heart Failure Education note:      Discussed 2000mg/day sodium restricted diet; patient verbalized understanding. Moderate daily exercise encouraged as tolerated. Discussed rest breaks as needed; patient verbalized understanding. Patient instructed to weigh self at the same time of each day, using same clothes and same scale; reinforced teaching to monitor for 3-5 lb weight increase over 1-2 days notify physician if charge noted. Patient verbalized understanding. Patient instructed to limit fluid intake to 2 liters per day. Patient verbalized understanding. Risks of smoking discussed with the patient if applicable; patient strongly discouraged to smoke. Patient verbalized understanding. Signs and symptoms of CHF discussed with patient, such as feeling more tired than normal, feeling short of breath, coughing that increases when you lie down, sudden weight gain, swelling of your feet, legs or belly. Patient verbalized understanding to notify physician office if these symptoms occur. Compliance with plan of care and further disease process causes discussed with patient, patient encouraged to keep all follow up appointments. Patient verbalized understanding.

## 2020-10-08 NOTE — PROGRESS NOTES
Report called to [facility]. Patient transported to room 2024. Right wrist assessed by Arik Duran and Ra Cedillo. Wrist soft, no bleeding or hematoma noted. Radial pulse palpable.

## 2020-10-08 NOTE — PLAN OF CARE
Problem: Cardiac:  Goal: Ability to maintain an adequate cardiac output will improve  Description: Ability to maintain an adequate cardiac output will improve  10/8/2020 1509 by Annie Zhao RN  Outcome: Ongoing  10/8/2020 0814 by Mathieu Russell RN  Outcome: Met This Shift  Goal: Hemodynamic stability will improve  Description: Hemodynamic stability will improve  Outcome: Ongoing     Problem: Fluid Volume:  Goal: Ability to achieve and maintain adequate urine output will improve  Description: Ability to achieve and maintain adequate urine output will improve  Outcome: Ongoing     Problem: Respiratory:  Goal: Respiratory status will improve  Description: Respiratory status will improve  Outcome: Ongoing

## 2020-10-08 NOTE — CARE COORDINATION
Needs Life Vest-faxed Face sheet, Order, cath report, echo report, and progress note to Ricky Tobias. Talked with [place]

## 2020-10-09 LAB
ALDOSTERONE COMMENT: NORMAL
ALDOSTERONE: 12.5 NG/DL

## 2020-10-18 LAB
RENIN ACTIVITY: 3.6 NG/ML/HR
RENIN COMMENT: NORMAL

## 2020-10-29 ENCOUNTER — TELEPHONE (OUTPATIENT)
Dept: FAMILY MEDICINE CLINIC | Age: 65
End: 2020-10-29

## 2020-10-29 NOTE — RESULT ENCOUNTER NOTE
Please let the patient know that the cologuard test was negative. We can repeat this test in 3 years.  Thanks

## 2020-10-29 NOTE — TELEPHONE ENCOUNTER
Please let the patient know that the cologuard test was nergative. We can repeat this test in 3 years.  Thanks

## 2023-10-19 ENCOUNTER — TELEPHONE (OUTPATIENT)
Dept: PRIMARY CARE CLINIC | Age: 68
End: 2023-10-19

## 2023-10-19 NOTE — TELEPHONE ENCOUNTER
LM that Dr. Beatrice Kiser is not taking new patients but to call back if he would like to schedule with Abi Vann.  ----- Message from José Antonio Talavera sent at 10/17/2023  3:56 PM EDT -----  Subject: Appointment Request    Reason for Call: New Patient/New to Provider Appointment needed: New   Patient Request Appointment    QUESTIONS    Reason for appointment request? No appointments available during search     Additional Information for Provider?  New patient est care with Joseline Rodriguez MD.   ---------------------------------------------------------------------------  --------------  [address]  [phone]; OK to leave message on voicemail  ---------------------------------------------------------------------------  --------------  SCRIPT ANSWERS

## 2023-10-24 ENCOUNTER — OFFICE VISIT (OUTPATIENT)
Dept: PRIMARY CARE CLINIC | Age: 68
End: 2023-10-24
Payer: MEDICARE

## 2023-10-24 VITALS
BODY MASS INDEX: 38.85 KG/M2 | DIASTOLIC BLOOD PRESSURE: 84 MMHG | SYSTOLIC BLOOD PRESSURE: 146 MMHG | HEART RATE: 61 BPM | HEIGHT: 69 IN | WEIGHT: 262.3 LBS | OXYGEN SATURATION: 98 %

## 2023-10-24 DIAGNOSIS — Z28.21 INFLUENZA VACCINE REFUSED: ICD-10-CM

## 2023-10-24 DIAGNOSIS — R19.04 LLQ ABDOMINAL MASS: Primary | ICD-10-CM

## 2023-10-24 DIAGNOSIS — I48.91 ATRIAL FIBRILLATION, UNSPECIFIED TYPE (HCC): ICD-10-CM

## 2023-10-24 DIAGNOSIS — Z12.5 SCREENING PSA (PROSTATE SPECIFIC ANTIGEN): ICD-10-CM

## 2023-10-24 DIAGNOSIS — R10.32 LEFT LOWER QUADRANT ABDOMINAL PAIN: ICD-10-CM

## 2023-10-24 DIAGNOSIS — I10 PRIMARY HYPERTENSION: ICD-10-CM

## 2023-10-24 DIAGNOSIS — Z00.00 ENCOUNTER FOR MEDICAL EXAMINATION TO ESTABLISH CARE: ICD-10-CM

## 2023-10-24 PROCEDURE — 3017F COLORECTAL CA SCREEN DOC REV: CPT | Performed by: NURSE PRACTITIONER

## 2023-10-24 PROCEDURE — 99204 OFFICE O/P NEW MOD 45 MIN: CPT | Performed by: NURSE PRACTITIONER

## 2023-10-24 PROCEDURE — 3077F SYST BP >= 140 MM HG: CPT | Performed by: NURSE PRACTITIONER

## 2023-10-24 PROCEDURE — 3079F DIAST BP 80-89 MM HG: CPT | Performed by: NURSE PRACTITIONER

## 2023-10-24 PROCEDURE — 1123F ACP DISCUSS/DSCN MKR DOCD: CPT | Performed by: NURSE PRACTITIONER

## 2023-10-24 PROCEDURE — 1036F TOBACCO NON-USER: CPT | Performed by: NURSE PRACTITIONER

## 2023-10-24 PROCEDURE — G8484 FLU IMMUNIZE NO ADMIN: HCPCS | Performed by: NURSE PRACTITIONER

## 2023-10-24 PROCEDURE — G8417 CALC BMI ABV UP PARAM F/U: HCPCS | Performed by: NURSE PRACTITIONER

## 2023-10-24 PROCEDURE — G8427 DOCREV CUR MEDS BY ELIG CLIN: HCPCS | Performed by: NURSE PRACTITIONER

## 2023-10-24 SDOH — ECONOMIC STABILITY: HOUSING INSECURITY
IN THE LAST 12 MONTHS, WAS THERE A TIME WHEN YOU DID NOT HAVE A STEADY PLACE TO SLEEP OR SLEPT IN A SHELTER (INCLUDING NOW)?: NO

## 2023-10-24 SDOH — ECONOMIC STABILITY: INCOME INSECURITY: HOW HARD IS IT FOR YOU TO PAY FOR THE VERY BASICS LIKE FOOD, HOUSING, MEDICAL CARE, AND HEATING?: NOT HARD AT ALL

## 2023-10-24 SDOH — ECONOMIC STABILITY: FOOD INSECURITY: WITHIN THE PAST 12 MONTHS, THE FOOD YOU BOUGHT JUST DIDN'T LAST AND YOU DIDN'T HAVE MONEY TO GET MORE.: NEVER TRUE

## 2023-10-24 SDOH — ECONOMIC STABILITY: FOOD INSECURITY: WITHIN THE PAST 12 MONTHS, YOU WORRIED THAT YOUR FOOD WOULD RUN OUT BEFORE YOU GOT MONEY TO BUY MORE.: NEVER TRUE

## 2023-10-24 ASSESSMENT — ENCOUNTER SYMPTOMS
RHINORRHEA: 0
DIARRHEA: 0
CONSTIPATION: 0
WHEEZING: 0
SHORTNESS OF BREATH: 0
EYE REDNESS: 0
VOMITING: 0
EYE DISCHARGE: 0
BACK PAIN: 0
COUGH: 0
SORE THROAT: 0
NAUSEA: 0
ABDOMINAL PAIN: 1

## 2023-10-24 ASSESSMENT — PATIENT HEALTH QUESTIONNAIRE - PHQ9
1. LITTLE INTEREST OR PLEASURE IN DOING THINGS: 0
SUM OF ALL RESPONSES TO PHQ QUESTIONS 1-9: 0
2. FEELING DOWN, DEPRESSED OR HOPELESS: 0
SUM OF ALL RESPONSES TO PHQ9 QUESTIONS 1 & 2: 0
SUM OF ALL RESPONSES TO PHQ QUESTIONS 1-9: 0

## 2023-10-24 NOTE — PROGRESS NOTES
wheezing. Abdominal:      General: Bowel sounds are normal.      Palpations: Abdomen is soft. Tenderness: There is abdominal tenderness in the left lower quadrant. There is no right CVA tenderness or left CVA tenderness. Comments: Palpable tender mass left lower quadrant deep in abdomen. Musculoskeletal:      Right lower leg: No edema. Left lower leg: No edema. Feet:      Right foot:      Skin integrity: Dry skin present. Toenail Condition: Right toenails are abnormally thick. Left foot:      Skin integrity: Dry skin present. Toenail Condition: Left toenails are abnormally thick. Lymphadenopathy:      Cervical: No cervical adenopathy. Skin:     General: Skin is warm. Findings: No rash. Neurological:      Mental Status: He is alert and oriented to person, place, and time. Psychiatric:         Mood and Affect: Mood normal.         Behavior: Behavior normal.         Thought Content: Thought content normal.         Assessment/Plan:   1. LLQ abdominal mass  -     CT ABDOMEN PELVIS W WO CONTRAST Additional Contrast? Radiologist Recommendation; Future  -     Comprehensive Metabolic Panel, Fasting; Future  -     CBC with Auto Differential; Future  2. Left lower quadrant abdominal pain  -     CT ABDOMEN PELVIS W WO CONTRAST Additional Contrast? Radiologist Recommendation; Future  -     Comprehensive Metabolic Panel, Fasting; Future  -     CBC with Auto Differential; Future  3. Atrial fibrillation, unspecified type ([address])  4. Primary hypertension  -     Comprehensive Metabolic Panel, Fasting; Future  -     CBC with Auto Differential; Future  5. Screening PSA (prostate specific antigen)  -     PSA Screening; Future  6. Encounter for medical examination to establish care  7.  Influenza vaccine refused     - Educated and refused    Complete CT of abdomen and pelvis  Complete lab work  Follow up with Dr. Kelsy Álvarez, cardiologist.    Return in about 3 months (around 1/24/2024) for AWV,

## 2023-10-24 NOTE — PATIENT INSTRUCTIONS
Complete CT of abdomen and pelvis  Complete lab work  Follow up with Dr. Esequiel Moreira, cardiologist.

## 2023-10-26 LAB
ABSOLUTE BASO #: 0.05 K/UL (ref 0–0.2)
ABSOLUTE EOS #: 0.24 K/UL (ref 0–0.5)
ABSOLUTE LYMPH #: 1.43 K/UL (ref 1–4)
ABSOLUTE MONO #: 0.44 K/UL (ref 0.2–1)
ABSOLUTE NEUT #: 2.77 K/UL (ref 1.5–7.5)
ALBUMIN SERPL-MCNC: 4.3 G/DL (ref 3.5–5.2)
ALK PHOSPHATASE: 70 U/L (ref 40–125)
ALT SERPL-CCNC: 15 U/L (ref 5–50)
ANION GAP SERPL CALCULATED.3IONS-SCNC: 9 MEQ/L (ref 7–16)
AST SERPL-CCNC: 20 U/L (ref 9–50)
BASOPHILS RELATIVE PERCENT: 1 %
BILIRUB SERPL-MCNC: 1 MG/DL
BUN BLDV-MCNC: 13 MG/DL (ref 8–23)
CALCIUM SERPL-MCNC: 9.6 MG/DL (ref 8.5–10.5)
CHLORIDE BLD-SCNC: 101 MEQ/L (ref 95–107)
CO2: 30 MEQ/L (ref 19–31)
CREAT SERPL-MCNC: 1.27 MG/DL (ref 0.8–1.4)
EGFR IF NONAFRICAN AMERICAN: 61 ML/MIN/1.73
EOSINOPHILS RELATIVE PERCENT: 4.8 %
GLUCOSE: 108 MG/DL (ref 70–99)
HCT VFR BLD CALC: 41.9 % (ref 40–51)
HEMOGLOBIN: 15 G/DL (ref 13.5–17)
LYMPHOCYTE %: 28.8 %
MCH RBC QN AUTO: 33.5 PG (ref 25–33)
MCHC RBC AUTO-ENTMCNC: 35.8 G/DL (ref 31–36)
MCV RBC AUTO: 93.5 FL (ref 80–99)
MONOCYTES # BLD: 8.9 %
NEUTROPHILS RELATIVE PERCENT: 55.9 %
PDW BLD-RTO: 12.5 % (ref 11.5–15)
PLATELETS: 221 K/UL (ref 130–400)
PMV BLD AUTO: 10.3 FL (ref 9.3–13)
POTASSIUM SERPL-SCNC: 3.9 MEQ/L (ref 3.5–5.4)
PSA, ULTRASENSITIVE: 0.91 NG/ML
RBC: 4.48 M/UL (ref 4.5–6.1)
SODIUM BLD-SCNC: 140 MEQ/L (ref 133–146)
TOTAL PROTEIN: 5.8 G/DL (ref 6.1–8.3)
WBC: 5 K/UL (ref 3.5–11)

## 2023-11-22 ENCOUNTER — TELEPHONE (OUTPATIENT)
Dept: PRIMARY CARE CLINIC | Age: 68
End: 2023-11-22

## 2023-11-22 DIAGNOSIS — R19.04 LLQ ABDOMINAL MASS: Primary | ICD-10-CM

## 2023-11-22 DIAGNOSIS — R10.32 LEFT LOWER QUADRANT ABDOMINAL PAIN: ICD-10-CM

## 2023-11-22 NOTE — TELEPHONE ENCOUNTER
You ordered a CT abd & pelvis w & w/o contrast. With the dx's given it is usually one or the other. Radiologist would recommend with contrast only. Asking if you would change order in epic?

## 2023-11-23 NOTE — TELEPHONE ENCOUNTER
Order changed in [id number] [place] [address] 15 but it would not let me DC old order?

## 2023-11-24 ENCOUNTER — HOSPITAL ENCOUNTER (OUTPATIENT)
Dept: CT IMAGING | Age: 68
End: 2023-11-24
Payer: MEDICARE

## 2023-11-24 DIAGNOSIS — R10.32 LEFT LOWER QUADRANT ABDOMINAL PAIN: ICD-10-CM

## 2023-11-24 DIAGNOSIS — R19.04 LLQ ABDOMINAL MASS: ICD-10-CM

## 2023-11-24 LAB
CREAT SERPL-MCNC: 1.4 MG/DL (ref 0.7–1.2)
GFR SERPL CREATININE-BSD FRML MDRD: 54 ML/MIN/1.73M2

## 2023-11-24 PROCEDURE — 74177 CT ABD & PELVIS W/CONTRAST: CPT

## 2023-11-24 PROCEDURE — 82565 ASSAY OF CREATININE: CPT

## 2023-11-24 PROCEDURE — 6360000004 HC RX CONTRAST MEDICATION: Performed by: NURSE PRACTITIONER

## 2023-11-24 PROCEDURE — 36415 COLL VENOUS BLD VENIPUNCTURE: CPT

## 2023-11-24 PROCEDURE — 2580000003 HC RX 258: Performed by: NURSE PRACTITIONER

## 2023-11-24 RX ORDER — 0.9 % SODIUM CHLORIDE 0.9 %
80 INTRAVENOUS SOLUTION INTRAVENOUS ONCE
Status: COMPLETED | OUTPATIENT
Start: 2023-11-24 | End: 2023-11-24

## 2023-11-24 RX ORDER — SODIUM CHLORIDE 0.9 % (FLUSH) 0.9 %
10 SYRINGE (ML) INJECTION ONCE
Status: COMPLETED | OUTPATIENT
Start: 2023-11-24 | End: 2023-11-24

## 2023-11-24 RX ADMIN — SODIUM CHLORIDE, PRESERVATIVE FREE 10 ML: 5 INJECTION INTRAVENOUS at 13:47

## 2023-11-24 RX ADMIN — SODIUM CHLORIDE 80 ML: 9 INJECTION, SOLUTION INTRAVENOUS at 13:47

## 2023-11-24 RX ADMIN — IOPAMIDOL 75 ML: 755 INJECTION, SOLUTION INTRAVENOUS at 13:46

## 2023-11-24 NOTE — TELEPHONE ENCOUNTER
The previous order was attached to the appointment and they had already checked in for that appointment so its unable to be canceled.

## 2023-12-03 DIAGNOSIS — E27.8 RIGHT ADRENAL MASS (HCC): Primary | ICD-10-CM

## 2024-01-05 ENCOUNTER — HOSPITAL ENCOUNTER (OUTPATIENT)
Dept: CT IMAGING | Age: 69
End: 2024-01-05
Payer: MEDICARE

## 2024-01-05 DIAGNOSIS — E27.8 RIGHT ADRENAL MASS (HCC): ICD-10-CM

## 2024-01-05 PROCEDURE — 74150 CT ABDOMEN W/O CONTRAST: CPT

## 2024-01-05 PROCEDURE — G1010 CDSM STANSON: HCPCS

## 2024-01-29 ENCOUNTER — OFFICE VISIT (OUTPATIENT)
Dept: PRIMARY CARE CLINIC | Age: 69
End: 2024-01-29
Payer: MEDICARE

## 2024-01-29 VITALS
SYSTOLIC BLOOD PRESSURE: 128 MMHG | HEART RATE: 72 BPM | WEIGHT: 272.6 LBS | OXYGEN SATURATION: 100 % | DIASTOLIC BLOOD PRESSURE: 80 MMHG | BODY MASS INDEX: 38.16 KG/M2 | HEIGHT: 71 IN

## 2024-01-29 DIAGNOSIS — M25.562 CHRONIC PAIN OF LEFT KNEE: ICD-10-CM

## 2024-01-29 DIAGNOSIS — Z71.89 LIVING WILL, COUNSELING/DISCUSSION: ICD-10-CM

## 2024-01-29 DIAGNOSIS — Z00.00 MEDICARE ANNUAL WELLNESS VISIT, SUBSEQUENT: Primary | ICD-10-CM

## 2024-01-29 DIAGNOSIS — G89.29 CHRONIC PAIN OF LEFT KNEE: ICD-10-CM

## 2024-01-29 DIAGNOSIS — Z12.11 COLON CANCER SCREENING: ICD-10-CM

## 2024-01-29 PROCEDURE — G0439 PPPS, SUBSEQ VISIT: HCPCS | Performed by: NURSE PRACTITIONER

## 2024-01-29 PROCEDURE — G8484 FLU IMMUNIZE NO ADMIN: HCPCS | Performed by: NURSE PRACTITIONER

## 2024-01-29 PROCEDURE — 3074F SYST BP LT 130 MM HG: CPT | Performed by: NURSE PRACTITIONER

## 2024-01-29 PROCEDURE — 3079F DIAST BP 80-89 MM HG: CPT | Performed by: NURSE PRACTITIONER

## 2024-01-29 PROCEDURE — 3017F COLORECTAL CA SCREEN DOC REV: CPT | Performed by: NURSE PRACTITIONER

## 2024-01-29 PROCEDURE — 1123F ACP DISCUSS/DSCN MKR DOCD: CPT | Performed by: NURSE PRACTITIONER

## 2024-01-29 ASSESSMENT — PATIENT HEALTH QUESTIONNAIRE - PHQ9
SUM OF ALL RESPONSES TO PHQ QUESTIONS 1-9: 0
1. LITTLE INTEREST OR PLEASURE IN DOING THINGS: 0
SUM OF ALL RESPONSES TO PHQ QUESTIONS 1-9: 0
SUM OF ALL RESPONSES TO PHQ QUESTIONS 1-9: 0
SUM OF ALL RESPONSES TO PHQ9 QUESTIONS 1 & 2: 0
2. FEELING DOWN, DEPRESSED OR HOPELESS: 0

## 2024-01-29 ASSESSMENT — LIFESTYLE VARIABLES
HOW MANY STANDARD DRINKS CONTAINING ALCOHOL DO YOU HAVE ON A TYPICAL DAY: 1 OR 2
HOW OFTEN DO YOU HAVE A DRINK CONTAINING ALCOHOL: MONTHLY OR LESS

## 2024-01-29 NOTE — PROGRESS NOTES
Medicare Annual Wellness Visit    Last Crawley is here for Medicare AWV    Assessment & Plan   Medicare annual wellness visit, subsequent  Living will, counseling/discussion  -     [facility] Referral to ACP Clinical Specialist  Chronic pain of left knee  -     XR KNEE LEFT (1-2 VIEWS); Future  Colon cancer screening  -     Fecal DNA Colorectal cancer screening (Cologuard)  Recommendations for Preventive Services Due: see orders and patient instructions/AVS.  Recommended screening schedule for the next 5-10 years is provided to the patient in written form: see Patient Instructions/AVS.     Return in 6 months (on 7/29/2024) for HTN, knee pain.     Subjective   The following acute and/or chronic problems were also addressed today:  He states wants to review CT results. He can still feel mass but no pain.    Bilateral knee pain.  He takes aspirin - seldom if needed.  He is has seen surgeon in he past.  He is asking about injections.      Patient's complete Health Risk Assessment and screening values have been reviewed and are found in Flowsheets. The following problems were reviewed today and where indicated follow up appointments were made and/or referrals ordered.    Positive Risk Factor Screenings with Interventions:                Activity, Diet, and Weight:  On average, how many days per week do you engage in moderate to strenuous exercise (like a brisk walk)?: 0 days  On average, how many minutes do you engage in exercise at this level?: 0 min    Do you eat balanced/healthy meals regularly?: Yes    Body mass index is 37.98 kg/m². (!) Abnormal      Inactivity Interventions:  Encouraged to eat less processed food and add exercise to daily routine.  See AVS for additional education material  Obesity Interventions:  See AVS for additional education material          Dentist Screen:  Have you seen the dentist within the past year?: (!) No    Intervention:  Advised to schedule with their dentist      Safety:  Do you have

## 2024-01-29 NOTE — PATIENT INSTRUCTIONS
Learning About Being Active as an Older Adult  Why is being active important as you get older?     Being active is one of the best things you can do for your health. And it's never too late to start. Being active--or getting active, if you aren't already--has definite benefits. It can:  Give you more energy,  Keep your mind sharp.  Improve balance to reduce your risk of falls.  Help you manage chronic illness with fewer medicines.  No matter how old you are, how fit you are, or what health problems you have, there is a form of activity that will work for you. And the more physical activity you can do, the better your overall health will be.  What kinds of activity can help you stay healthy?  Being more active will make your daily activities easier. Physical activity includes planned exercise and things you do in daily life. There are four types of activity:  Aerobic.  Doing aerobic activity makes your heart and lungs strong.  Includes walking, dancing, and gardening.  Aim for at least 2½ hours spread throughout the week.  It improves your energy and can help you sleep better.  Muscle-strengthening.  This type of activity can help maintain muscle and strengthen bones.  Includes climbing stairs, using resistance bands, and lifting or carrying heavy loads.  Aim for at least twice a week.  It can help protect the knees and other joints.  Stretching.  Stretching gives you better range of motion in joints and muscles.  Includes upper arm stretches, calf stretches, and gentle yoga.  Aim for at least twice a week, preferably after your muscles are warmed up from other activities.  It can help you function better in daily life.  Balancing.  This helps you stay coordinated and have good posture.  Includes heel-to-toe walking, angela chi, and certain types of yoga.  Aim for at least 3 days a week.  It can reduce your risk of falling.  Even if you have a hard time meeting the recommendations, it's better to be more active

## 2024-01-30 ENCOUNTER — CLINICAL DOCUMENTATION (OUTPATIENT)
Dept: SPIRITUAL SERVICES | Age: 69
End: 2024-01-30

## 2024-01-30 ENCOUNTER — HOSPITAL ENCOUNTER (OUTPATIENT)
Age: 69
Discharge: HOME | End: 2024-02-01
Payer: MEDICARE

## 2024-01-30 ENCOUNTER — HOSPITAL ENCOUNTER (OUTPATIENT)
Dept: GENERAL RADIOLOGY | Age: 69
Discharge: HOME | End: 2024-02-01
Payer: MEDICARE

## 2024-01-30 DIAGNOSIS — G89.29 CHRONIC PAIN OF LEFT KNEE: ICD-10-CM

## 2024-01-30 DIAGNOSIS — M25.562 CHRONIC PAIN OF LEFT KNEE: ICD-10-CM

## 2024-01-30 PROCEDURE — 73560 X-RAY EXAM OF KNEE 1 OR 2: CPT

## 2024-01-30 NOTE — ACP (ADVANCE CARE PLANNING)
-  Date:                Intervention:  [] Spoke with Patient   [] Left Voice mail [] Email / Mail    [] MyChart  [] Other (Specify) :       Outcomes:           []  Additional Outreach -  Date:     (Specify Dates & special circumstances):    Outcomes:         Thank you for this referral.

## 2024-02-02 DIAGNOSIS — G89.29 CHRONIC PAIN OF LEFT KNEE: Primary | ICD-10-CM

## 2024-02-02 DIAGNOSIS — M25.562 CHRONIC PAIN OF LEFT KNEE: Primary | ICD-10-CM

## 2024-02-08 ENCOUNTER — OFFICE VISIT (OUTPATIENT)
Age: 69
End: 2024-02-08
Payer: MEDICARE

## 2024-02-08 VITALS — HEIGHT: 71 IN | WEIGHT: 272 LBS | BODY MASS INDEX: 38.08 KG/M2

## 2024-02-08 DIAGNOSIS — M17.12 PRIMARY OSTEOARTHRITIS OF LEFT KNEE: Primary | ICD-10-CM

## 2024-02-08 PROCEDURE — G8417 CALC BMI ABV UP PARAM F/U: HCPCS | Performed by: ORTHOPAEDIC SURGERY

## 2024-02-08 PROCEDURE — 99204 OFFICE O/P NEW MOD 45 MIN: CPT | Performed by: ORTHOPAEDIC SURGERY

## 2024-02-08 PROCEDURE — 1036F TOBACCO NON-USER: CPT | Performed by: ORTHOPAEDIC SURGERY

## 2024-02-08 PROCEDURE — 3017F COLORECTAL CA SCREEN DOC REV: CPT | Performed by: ORTHOPAEDIC SURGERY

## 2024-02-08 PROCEDURE — 1123F ACP DISCUSS/DSCN MKR DOCD: CPT | Performed by: ORTHOPAEDIC SURGERY

## 2024-02-08 PROCEDURE — G8484 FLU IMMUNIZE NO ADMIN: HCPCS | Performed by: ORTHOPAEDIC SURGERY

## 2024-02-08 PROCEDURE — G8427 DOCREV CUR MEDS BY ELIG CLIN: HCPCS | Performed by: ORTHOPAEDIC SURGERY

## 2024-02-08 NOTE — PROGRESS NOTES
[facility] Orth[facility]ics & Sports Medicine      [facility] PHYSICIANS [facility], [facility]  [facility] ORTHOPAEDICS AND SPORTS MEDICINE  [address]  [place] [address]  Dept: [phone]  Dept Fax: [phone]    Chief Compliant:  Chief Complaint   Patient presents with    Knee Pain     Left knee        History of Present Illness:  This is a pleasant 68 y.o. male who is here for evaluation of left knee pain.  This been getting progressively worse over decades.  He states has been dealing with this for 30 or 40 years.  He has been having doctors tell him to wait until he gets older to have a knee replacement.  He has previously tried various anti-inflammatories.  He had what he describes as a bone cyst along the medial aspect of his knee that was removed when he was a kid.  He has atrial fibrillation and takes Plavix.  He has no history of diabetes or smoking or DVT or cancer.    Physical Exam: The left knee has a varus deformity.  Range of motion is from 0 to 115 degrees of flexion.  He has joint line tenderness.  There is no instability with varus or valgus stress.  He has an antalgic gait pattern.    Imaging: Prior x-rays of the left knee done elsewhere were independently reviewed by myself and discussed with him.  This shows quite severe tricompartmental bone-on-bone joint space narrowing with subchondral cyst subchondral sclerosis and osteophyte formation.      Assessment and Plan:    This is a pleasant 68 y.o. 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  I discussed with him different options including cortisone and gel injections and knee replacement surgery due to the structural deformity of his knee and the tricompartmental bone-on-bone condition I discussed that his best chance for long-term pain relief is total knee replacement surgery.  Risks, benefits, and alternatives of total knee replacement were discussed with the patient.  RIsks

## 2024-02-15 LAB — NONINV COLON CA DNA+OCC BLD SCRN STL QL: POSITIVE

## 2024-02-20 DIAGNOSIS — R19.5 POSITIVE COLORECTAL CANCER SCREENING USING COLOGUARD TEST: Primary | ICD-10-CM

## 2024-02-25 PROBLEM — R19.5 POSITIVE COLORECTAL CANCER SCREENING USING COLOGUARD TEST: Status: ACTIVE | Noted: 2024-02-25

## 2024-02-25 NOTE — PROGRESS NOTES
protocol with [facility]. Procedure risk and complications, risk-benefit alternatives, recovery restrictions were all discussed with the patient at length.  Pre-operative, intra-operative, post-operative details regarding procedure discussed with patient and all questions were answered. Patient understands and wants to proceed.    ENCOUNTER DIAGNOSES    ICD-10-CM    1. Positive colorectal cancer screening using Cologuard test  R19.5           Return for Follow up after colonsocopy to discuss results.    The patient, Last Crawley is a 68 y.o. male, was seen with a total time spent of 35 minutes for the visit on this date of service by the E/M provider. The time component had both face to face and non face to face time spent in determining the total time component.  Counseling and education regarding the patient's diagnosis listed below and the patient's options regarding those diagnoses were also included in determining the time component.      Electronically signed by ANGELITO Wolf CNP  on 2/26/2024 at 12:54 PM

## 2024-02-26 ENCOUNTER — OFFICE VISIT (OUTPATIENT)
Age: 69
End: 2024-02-26
Payer: MEDICARE

## 2024-02-26 VITALS
HEIGHT: 71 IN | DIASTOLIC BLOOD PRESSURE: 87 MMHG | OXYGEN SATURATION: 97 % | TEMPERATURE: 97.2 F | BODY MASS INDEX: 38.64 KG/M2 | SYSTOLIC BLOOD PRESSURE: 126 MMHG | HEART RATE: 62 BPM | WEIGHT: 276 LBS

## 2024-02-26 DIAGNOSIS — R19.5 POSITIVE COLORECTAL CANCER SCREENING USING COLOGUARD TEST: Primary | ICD-10-CM

## 2024-02-26 PROCEDURE — 1123F ACP DISCUSS/DSCN MKR DOCD: CPT | Performed by: NURSE PRACTITIONER

## 2024-02-26 PROCEDURE — G8417 CALC BMI ABV UP PARAM F/U: HCPCS | Performed by: NURSE PRACTITIONER

## 2024-02-26 PROCEDURE — 3017F COLORECTAL CA SCREEN DOC REV: CPT | Performed by: NURSE PRACTITIONER

## 2024-02-26 PROCEDURE — 1036F TOBACCO NON-USER: CPT | Performed by: NURSE PRACTITIONER

## 2024-02-26 PROCEDURE — 3074F SYST BP LT 130 MM HG: CPT | Performed by: NURSE PRACTITIONER

## 2024-02-26 PROCEDURE — 99203 OFFICE O/P NEW LOW 30 MIN: CPT | Performed by: NURSE PRACTITIONER

## 2024-02-26 PROCEDURE — 3079F DIAST BP 80-89 MM HG: CPT | Performed by: NURSE PRACTITIONER

## 2024-02-26 PROCEDURE — G8427 DOCREV CUR MEDS BY ELIG CLIN: HCPCS | Performed by: NURSE PRACTITIONER

## 2024-02-26 PROCEDURE — G8484 FLU IMMUNIZE NO ADMIN: HCPCS | Performed by: NURSE PRACTITIONER

## 2024-02-26 ASSESSMENT — ENCOUNTER SYMPTOMS
COUGH: 0
RHINORRHEA: 0
SORE THROAT: 0
SHORTNESS OF BREATH: 0

## 2024-02-26 NOTE — PATIENT INSTRUCTIONS
[facility]  Mando Rojas MD, FACS  Liya Vo, APRN-CNP  [address], [address]  [place] [address]  Phone: [phone]  Fax: [phone]    Miralax (Polyethylene Glycol)  STOP Aspirin 7 Days prior to Procedure  STOP all other Blood Thinners 5 Days prior to Procedure    **DO NOT EAT ANY SOLID FOOD THE DAY BEFORE YOUR PROCEDURE**  **YOU MUST BE ON A CLEAR LIQUID DIET ONLY**    Approved Clear Liquids:  Any flavor of soda except Red or Purple  Fruit Juice without pulp  Coffee or tea without dairy products  Jell-O without fruit or toppings, No Red or Purple  Pop-olayinka or Italian Ice, No Red or Purple  Chicken or Beef broth and bouillon      DRINK PLENTY OF WATER THROUGHOUT THE DAY    At 10:00 am the day before your procedure, take all 4 Dulcolax Tablets.  At 6:00 pm the day before your procedure, mix the ENTIRE bottle of Miralax (238 gram or Close to it) with 64 ounces of Gatorade (NOT RED or PURPLE).  You must consume the entire 64 ounces by 8:00 pm.  Continue clear liquid diet up until midnight.    NOTHING TO EAT, DRINK, SMOKE, OR CHEW AFTER MIDNIGHT    You may brush your teeth, rinse, gargle, and spit.  Heart or Blood pressure medications ONLY with a small sip of water, unless otherwise directed.  You will be scheduled for a pre-admission phone call prior to your procedure for your chart to be reviewed with a nurse to give you more specific instructions.  Shower with regular soap and water.    YOU MUST HAVE AN ADULT EITHER DRIVE YOU OR RIDE IN A CAB WITH YOU

## 2024-03-01 ENCOUNTER — TELEPHONE (OUTPATIENT)
Age: 69
End: 2024-03-01

## 2024-03-01 RX ORDER — BISACODYL 5 MG/1
TABLET, DELAYED RELEASE ORAL
Qty: 4 TABLET | Refills: 0 | Status: SHIPPED | OUTPATIENT
Start: 2024-03-01

## 2024-03-01 RX ORDER — POLYETHYLENE GLYCOL 3350 17 G/17G
POWDER, FOR SOLUTION ORAL
Qty: 238 G | Refills: 0 | Status: SHIPPED | OUTPATIENT
Start: 2024-03-01

## 2024-03-01 NOTE — TELEPHONE ENCOUNTER
Colonoscopy +/- polypectomy or biopsy  Scheduled with Vicki  Scheduled 3/18 @ 2:00  PH PAT 3/12 @ 2:00

## 2024-03-06 ENCOUNTER — TELEPHONE (OUTPATIENT)
Age: 69
End: 2024-03-06

## 2024-03-06 NOTE — TELEPHONE ENCOUNTER
Melody from Dr. Wilson's office called to have a new clearance faxed over , due to not able to see date and procedure on the first clearance sent.  Cardiac clearance sent.

## 2024-03-12 ENCOUNTER — HOSPITAL ENCOUNTER (OUTPATIENT)
Dept: PREADMISSION TESTING | Age: 69
Discharge: HOME | End: 2024-03-16

## 2024-03-12 VITALS — WEIGHT: 270 LBS | BODY MASS INDEX: 37.8 KG/M2 | HEIGHT: 71 IN

## 2024-03-12 NOTE — PROGRESS NOTES
Pre-op Instructions For Out-Patient Endoscopy Surgery    Medication Instructions:  Please stop herbs and any supplements now (includes vitamins and minerals).    Please contact your surgeon and prescribing physician for pre-op instructions for any blood thinners.  Told to hold aspirin and plavix 5 days per Dr Rojas  If you have inhalers/aerosol treatments at home, please use them the morning of your surgery and bring the inhalers with you to the hospital.    Please take the following medications the morning of your surgery with a sip of water:    metoprolol and entresto    Surgery Instructions:  After midnight before surgery:  Do not eat or drink anything, including water, mints, gum, and hard candy.  You may brush your teeth without swallowing.  No smoking, chewing tobacco, or street drugs.    Please shower or bathe before surgery.       Please do not wear any cologne, lotion, powder, jewelry, piercings, perfume, makeup, nail polish, hair accessories, or hair spray on the day of surgery.  Wear loose comfortable clothing.    Leave your valuables at home but bring a payment source for any after-surgery prescriptions you plan to fill at [facility].  Bring a storage case for any glasses/contacts.    An adult who is responsible for you MUST drive you home and should be with you for the first 24 hours after surgery.     The Day of Surgery:  Arrive at [facility] Surgery Entrance at the time directed by your surgeon and check in at the desk.     If you have a living will or healthcare power of , please bring a copy.    You will be taken to the pre-op holding area where you will be prepared for surgery.  A physical assessment will be performed by a nurse practitioner or house officer.  Your IV will be started and you will meet your anesthesiologist.    When you go to surgery, your family will be directed to the surgical waiting room, where the doctor should speak with them after your

## 2024-03-14 NOTE — PRE-PROCEDURE INSTRUCTIONS
No answer, left message ?                             Unable to leave message ?    When were you told to arrive at hospital ?  8543    Do you have a  ? Not yet.  Pt working on getting someone. He was instructed to call Dr. Rojas's office tomorrow if unable to find someone.     Are you on any blood thinners ?        yes             If yes when did you stop taking ? Today    Do you have your prep Rx filled and instruction ?  yes    Nothing to eat the day before , only clear liquids.yes    Are you experiencing any covid symptoms ? no    Do you have any infections or rash we should be aware of ?no      Do you have the Hibiclens soap to use the night before and the morning of surgery ?    Nothing to eat or drink after midnight, only a sip of water to take any medication instructed to take the night before.yes  Wear comfortable clothing, leave any valuables at home, remove any jewelry and body piercing . yes

## 2024-03-15 ENCOUNTER — ANESTHESIA EVENT (OUTPATIENT)
Dept: ENDOSCOPY | Age: 69
End: 2024-03-15
Payer: MEDICARE

## 2024-03-18 ENCOUNTER — ANESTHESIA (OUTPATIENT)
Dept: ENDOSCOPY | Age: 69
End: 2024-03-18
Payer: MEDICARE

## 2024-03-18 ENCOUNTER — HOSPITAL ENCOUNTER (OUTPATIENT)
Age: 69
Setting detail: OUTPATIENT SURGERY
Discharge: HOME | End: 2024-03-18
Attending: SURGERY | Admitting: SURGERY
Payer: MEDICARE

## 2024-03-18 VITALS
TEMPERATURE: 97.1 F | HEIGHT: 71 IN | OXYGEN SATURATION: 97 % | SYSTOLIC BLOOD PRESSURE: 143 MMHG | BODY MASS INDEX: 37.8 KG/M2 | HEART RATE: 69 BPM | RESPIRATION RATE: 16 BRPM | DIASTOLIC BLOOD PRESSURE: 81 MMHG | WEIGHT: 270 LBS

## 2024-03-18 DIAGNOSIS — R19.5 POSITIVE COLORECTAL CANCER SCREENING USING COLOGUARD TEST: ICD-10-CM

## 2024-03-18 PROCEDURE — 3700000000 HC ANESTHESIA ATTENDED CARE: Performed by: SURGERY

## 2024-03-18 PROCEDURE — 2500000003 HC RX 250 WO HCPCS: Performed by: NURSE ANESTHETIST, CERTIFIED REGISTERED

## 2024-03-18 PROCEDURE — 2580000003 HC RX 258: Performed by: ANESTHESIOLOGY

## 2024-03-18 PROCEDURE — 6360000002 HC RX W HCPCS: Performed by: NURSE ANESTHETIST, CERTIFIED REGISTERED

## 2024-03-18 PROCEDURE — 7100000030 HC ASPR PHASE II RECOVERY - FIRST 15 MIN: Performed by: SURGERY

## 2024-03-18 PROCEDURE — 7100000000 HC PACU RECOVERY - FIRST 15 MIN: Performed by: SURGERY

## 2024-03-18 PROCEDURE — 2500000003 HC RX 250 WO HCPCS: Performed by: ANESTHESIOLOGY

## 2024-03-18 PROCEDURE — 7100000001 HC PACU RECOVERY - ADDTL 15 MIN: Performed by: SURGERY

## 2024-03-18 PROCEDURE — 7100000031 HC ASPR PHASE II RECOVERY - ADDTL 15 MIN: Performed by: SURGERY

## 2024-03-18 PROCEDURE — 3609010300 HC COLONOSCOPY W/BIOPSY SINGLE/MULTIPLE: Performed by: SURGERY

## 2024-03-18 PROCEDURE — 2709999900 HC NON-CHARGEABLE SUPPLY: Performed by: SURGERY

## 2024-03-18 PROCEDURE — 3700000001 HC ADD 15 MINUTES (ANESTHESIA): Performed by: SURGERY

## 2024-03-18 PROCEDURE — 88305 TISSUE EXAM BY PATHOLOGIST: CPT

## 2024-03-18 PROCEDURE — 7100000010 HC PHASE II RECOVERY - FIRST 15 MIN: Performed by: SURGERY

## 2024-03-18 PROCEDURE — 7100000011 HC PHASE II RECOVERY - ADDTL 15 MIN: Performed by: SURGERY

## 2024-03-18 RX ORDER — SODIUM CHLORIDE 0.9 % (FLUSH) 0.9 %
5-40 SYRINGE (ML) INJECTION EVERY 12 HOURS SCHEDULED
Status: DISCONTINUED | OUTPATIENT
Start: 2024-03-18 | End: 2024-03-18 | Stop reason: HOSPADM

## 2024-03-18 RX ORDER — SODIUM CHLORIDE 9 MG/ML
INJECTION, SOLUTION INTRAVENOUS PRN
Status: DISCONTINUED | OUTPATIENT
Start: 2024-03-18 | End: 2024-03-18 | Stop reason: HOSPADM

## 2024-03-18 RX ORDER — SODIUM CHLORIDE, SODIUM LACTATE, POTASSIUM CHLORIDE, CALCIUM CHLORIDE 600; 310; 30; 20 MG/100ML; MG/100ML; MG/100ML; MG/100ML
INJECTION, SOLUTION INTRAVENOUS CONTINUOUS
Status: DISCONTINUED | OUTPATIENT
Start: 2024-03-18 | End: 2024-03-18 | Stop reason: HOSPADM

## 2024-03-18 RX ORDER — EPHEDRINE SULFATE/0.9% NACL/PF 25 MG/5 ML
SYRINGE (ML) INTRAVENOUS PRN
Status: DISCONTINUED | OUTPATIENT
Start: 2024-03-18 | End: 2024-03-18 | Stop reason: SDUPTHER

## 2024-03-18 RX ORDER — DEXAMETHASONE SODIUM PHOSPHATE 4 MG/ML
INJECTION, SOLUTION INTRA-ARTICULAR; INTRALESIONAL; INTRAMUSCULAR; INTRAVENOUS; SOFT TISSUE PRN
Status: DISCONTINUED | OUTPATIENT
Start: 2024-03-18 | End: 2024-03-18 | Stop reason: SDUPTHER

## 2024-03-18 RX ORDER — SODIUM CHLORIDE 0.9 % (FLUSH) 0.9 %
5-40 SYRINGE (ML) INJECTION PRN
Status: DISCONTINUED | OUTPATIENT
Start: 2024-03-18 | End: 2024-03-18 | Stop reason: HOSPADM

## 2024-03-18 RX ORDER — LIDOCAINE HYDROCHLORIDE 20 MG/ML
INJECTION, SOLUTION EPIDURAL; INFILTRATION; INTRACAUDAL; PERINEURAL PRN
Status: DISCONTINUED | OUTPATIENT
Start: 2024-03-18 | End: 2024-03-18 | Stop reason: SDUPTHER

## 2024-03-18 RX ORDER — PROPOFOL 10 MG/ML
INJECTION, EMULSION INTRAVENOUS PRN
Status: DISCONTINUED | OUTPATIENT
Start: 2024-03-18 | End: 2024-03-18 | Stop reason: SDUPTHER

## 2024-03-18 RX ORDER — ONDANSETRON 2 MG/ML
INJECTION INTRAMUSCULAR; INTRAVENOUS PRN
Status: DISCONTINUED | OUTPATIENT
Start: 2024-03-18 | End: 2024-03-18 | Stop reason: SDUPTHER

## 2024-03-18 RX ORDER — LIDOCAINE HYDROCHLORIDE 10 MG/ML
1 INJECTION, SOLUTION EPIDURAL; INFILTRATION; INTRACAUDAL; PERINEURAL
Status: COMPLETED | OUTPATIENT
Start: 2024-03-18 | End: 2024-03-18

## 2024-03-18 RX ORDER — FENTANYL CITRATE 50 UG/ML
INJECTION, SOLUTION INTRAMUSCULAR; INTRAVENOUS PRN
Status: DISCONTINUED | OUTPATIENT
Start: 2024-03-18 | End: 2024-03-18 | Stop reason: SDUPTHER

## 2024-03-18 RX ADMIN — DEXAMETHASONE SODIUM PHOSPHATE 8 MG: 4 INJECTION INTRA-ARTICULAR; INTRALESIONAL; INTRAMUSCULAR; INTRAVENOUS; SOFT TISSUE at 13:24

## 2024-03-18 RX ADMIN — LIDOCAINE HYDROCHLORIDE 1 ML: 10 INJECTION, SOLUTION EPIDURAL; INFILTRATION; INTRACAUDAL; PERINEURAL at 11:55

## 2024-03-18 RX ADMIN — LIDOCAINE HYDROCHLORIDE 100 MG: 20 INJECTION, SOLUTION EPIDURAL; INFILTRATION; INTRACAUDAL; PERINEURAL at 13:16

## 2024-03-18 RX ADMIN — PROPOFOL 150 MG: 10 INJECTION, EMULSION INTRAVENOUS at 13:16

## 2024-03-18 RX ADMIN — EPHEDRINE SULFATE 5 MG: 5 INJECTION INTRAVENOUS at 13:49

## 2024-03-18 RX ADMIN — ONDANSETRON 4 MG: 2 INJECTION INTRAMUSCULAR; INTRAVENOUS at 13:24

## 2024-03-18 RX ADMIN — PROPOFOL 50 MG: 10 INJECTION, EMULSION INTRAVENOUS at 13:17

## 2024-03-18 RX ADMIN — FENTANYL CITRATE 25 MCG: 50 INJECTION, SOLUTION INTRAMUSCULAR; INTRAVENOUS at 13:24

## 2024-03-18 RX ADMIN — EPHEDRINE SULFATE 10 MG: 5 INJECTION INTRAVENOUS at 13:42

## 2024-03-18 RX ADMIN — FENTANYL CITRATE 25 MCG: 50 INJECTION, SOLUTION INTRAMUSCULAR; INTRAVENOUS at 13:20

## 2024-03-18 RX ADMIN — EPHEDRINE SULFATE 10 MG: 5 INJECTION INTRAVENOUS at 13:45

## 2024-03-18 RX ADMIN — SODIUM CHLORIDE, POTASSIUM CHLORIDE, SODIUM LACTATE AND CALCIUM CHLORIDE: 600; 310; 30; 20 INJECTION, SOLUTION INTRAVENOUS at 11:55

## 2024-03-18 ASSESSMENT — PAIN - FUNCTIONAL ASSESSMENT
PAIN_FUNCTIONAL_ASSESSMENT: NONE - DENIES PAIN

## 2024-03-18 ASSESSMENT — ENCOUNTER SYMPTOMS
NAUSEA: 0
SINUS PRESSURE: 0
SHORTNESS OF BREATH: 0
ABDOMINAL PAIN: 0

## 2024-03-18 NOTE — H&P
HISTORY and PHYSICAL  [facility]       NAME:  Last Crawley  MRN: [id number]   YOB: 1955   Date: 3/18/2024   Age: 68 y.o.  Gender: male       COMPLAINT AND PRESENT HISTORY:       Last Crawley is 68 y.o.,   male, having a Diagnostic Colonoscopy, for hx of: Pre-Op Diagnosis Codes:     * Positive colorectal cancer screening using Cologuard test     Prior Colonoscopy was done approx 20 yrs ago with polypectomy.     Denies abdominal pain including bloating/gas.    No changes in bowel habits.    No diarrhea, constipation, blood in stools, black tarry stools.   Pt denies hx of hemorrhoids.    No changes in appetite and unintended weight loss. Denies any nausea or vomiting.   No  heartburn, indigestion or acid reflux.     Pt denies Family Hx of colon cancer.    Medical history reviewed:  Atrial Fib.    Patient voices feeling well today. Denies any recent fever or chills, chest pain/pressure.  Pt reports no  SOB.     Patient has been NPO since midnight. No blood thinners in the past  5-days.(Aspirin, plavix)  Pt took  metoprolol and entresto this am    Patient states  has completed and followed prescribed prep with clear outcome.      Patient denies any personal or family problems with anesthesia.    RECENT LABS, IMAGING AND TESTING     Lab Results   Component Value Date    WBC 5.0 10/25/2023    RBC 4.48 (L) 10/25/2023    HGB 15.0 10/25/2023    HCT 41.9 10/25/2023    MCV 93.5 10/25/2023    MCH 33.5 (H) 10/25/2023    MCHC 35.8 10/25/2023    RDW 12.5 10/25/2023     10/25/2023    MPV 10.3 10/25/2023        Lab Results   Component Value Date     10/25/2023    K 3.9 10/25/2023     10/25/2023    CO2 30 10/25/2023    BUN 13 10/25/2023    CREATININE 1.4 (H) 11/24/2023    GLUCOSE 108 (H) 10/25/2023    CALCIUM 9.6 10/25/2023    PROT 5.8 (L) 10/25/2023    LABALBU 4.3 10/25/2023    BILITOT 1.0 10/25/2023    ALKPHOS 70 10/25/2023    AST 20 10/25/2023    ALT 15 10/25/2023         PAST

## 2024-03-18 NOTE — OP NOTE
[facility] General Surgery   Mando Rojas MD, FACS  Liya Vo, APRN-CNP  [address], [address]  [place] [address]  P: [phone], F: [phone]    PROCEDURE NOTE    DATE OF PROCEDURE: 3/18/2024    SURGEON: Mando Rojas MD    ASSISTANT: None    PREOPERATIVE DIAGNOSIS: Positive Cologuard test    POSTOPERATIVE DIAGNOSIS: Suspicious polypoid masses ascending colon along with multiple other polyps up to about 60 cm from anal verge. (Neris ink tattooing was done at that level marking the line of resection)  sigmoid diverticulosis.    OPERATION: Total colonoscopy to cecum.  Multiple biopsies suspicious polypoid lesions ascending colon.  Transverse colon polypectomy with large cold biopsy forceps.    ANESTHESIA: General    ESTIMATED BLOOD LOSS: None    COMPLICATIONS: None     SPECIMENS:  Was Obtained:     HISTORY: The patient is a 68 y.o. year old male with history of above preop diagnosis.  I recommended colonoscopy with possible biopsy or polypectomy and I explained the risk, benefits, expected outcome, and alternatives to the procedure.  Risks included but are not limited to bleeding, infection, respiratory distress, hypotension, and perforation of the colon and possibility of missing a lesion.  The patient understands and is in agreement.      PROCEDURE: The patient was given IV conscious sedation.  The patient's SPO2 remained above 90% throughout the procedure. Digital rectal exam was normal.  The colonoscope was inserted through the anus into the rectum and advanced under direct vision to the cecum without difficulty.  Terminal ileum was examined for approximately 2 inches.  The prep was good.      Findings:  Terminal ileum: not examined    Cecum/Ascending colon: abnormal: Large suspicious polypoid lesions spread over few folds in the ascending colon.  Multiple biopsies obtained.  Few other polyps in the ascending colon.    Transverse colon: abnormal: Multiple polyps transverse

## 2024-03-18 NOTE — ANESTHESIA PRE PROCEDURE
Weight: 122.5 kg (270 lb)   Height: 1.803 m (5' 11\")                                              BP Readings from Last 3 Encounters:   03/18/24 134/88   02/26/24 126/87   01/29/24 128/80       NPO Status: Time of last liquid consumption: 2300                        Time of last solid consumption: 2300                        Date of last liquid consumption: 03/17/24                        Date of last solid food consumption: 03/16/24    BMI:   Wt Readings from Last 3 Encounters:   03/18/24 122.5 kg (270 lb)   03/12/24 122.5 kg (270 lb)   02/26/24 125.2 kg (276 lb)     Body mass index is 37.66 kg/m².    CBC:   Lab Results   Component Value Date/Time    WBC 5.0 10/25/2023 10:11 AM    WBC 10.4 10/08/2020 05:12 AM    RBC 4.48 10/25/2023 10:11 AM    HGB 15.0 10/25/2023 10:11 AM    HCT 41.9 10/25/2023 10:11 AM    MCV 93.5 10/25/2023 10:11 AM    RDW 12.5 10/25/2023 10:11 AM     10/25/2023 10:11 AM     10/08/2020 05:12 AM       CMP:   Lab Results   Component Value Date/Time     10/25/2023 10:11 AM    K 3.9 10/25/2023 10:11 AM     10/25/2023 10:11 AM    CO2 30 10/25/2023 10:11 AM    BUN 13 10/25/2023 10:11 AM    CREATININE 1.4 11/24/2023 01:27 PM    GFRAA >60 10/08/2020 05:12 AM    AGRATIO NOT REPORTED 10/05/2020 04:05 PM    LABGLOM 54 11/24/2023 01:27 PM    GLUCOSE 108 10/25/2023 10:11 AM    PROT 5.8 10/25/2023 10:11 AM    CALCIUM 9.6 10/25/2023 10:11 AM    BILITOT 1.0 10/25/2023 10:11 AM    ALKPHOS 70 10/25/2023 10:11 AM    ALKPHOS 125 10/05/2020 04:05 PM    AST 20 10/25/2023 10:11 AM    ALT 15 10/25/2023 10:11 AM       POC Tests: No results for input(s): \"POCGLU\", \"POCNA\", \"POCK\", \"POCCL\", \"POCBUN\", \"POCHEMO\", \"POCHCT\" in the last 72 hours.    Coags:   Lab Results   Component Value Date/Time    PROTIME 14.4 10/06/2020 03:49 AM    INR 1.1 10/06/2020 03:49 AM    APTT 29.9 10/05/2020 04:05 PM       HCG (If Applicable): No results found for: \"PREGTESTUR\", \"PREGSERUM\", \"HCG\", \"HCGQUANT\"     ABGs: No

## 2024-03-18 NOTE — ANESTHESIA POSTPROCEDURE EVALUATION
Department of Anesthesiology  Postprocedure Note    Patient: Last Crawley  MRN: [id number]  YOB: 1955  Date of evaluation: 3/18/2024    Procedure Summary       Date: 03/18/24 Room / Location: [place] [address] / [facility]    Anesthesia Start: 1310 Anesthesia Stop: 1407    Procedure: COLONOSCOPY POLYPECTOMY BIOPSY WITH INK INJECTION AT 58 FROM ANAL (Rectum) Diagnosis:       Positive colorectal cancer screening using Cologuard test      (Positive colorectal cancer screening using Cologuard test [R19.5])    Surgeons: Mando Rojas MD Responsible Provider: Kehinde Isaac MD    Anesthesia Type: General ASA Status: 3            Anesthesia Type: General    Sergio Phase I: Sergio Score: 10    Sergio Phase II: Sergio Score: 10    Anesthesia Post Evaluation    Comments: POST- ANESTHESIA EVALUATION       Pt Name: Last Crawley  MRN: [id number]  YOB: 1955  Date of evaluation: 3/18/2024  Time:  5:13 PM      BP (!) 143/81   Pulse 69   Temp 97.1 °F (36.2 °C) (Infrared)   Resp 16   Ht 1.803 m (5' 11\")   Wt 122.5 kg (270 lb)   SpO2 97%   BMI 37.66 kg/m²      Consciousness Level  Awake  Cardiopulmonary Status  Stable  Pain Adequately Treated YES  Nausea / Vomiting  NO  Adequate Hydration  YES  Anesthesia Related Complications NONE      Electronically signed by Kehinde Isaac MD on 3/18/2024 at 5:13 PM           No notable events documented.

## 2024-03-21 ENCOUNTER — OFFICE VISIT (OUTPATIENT)
Age: 69
End: 2024-03-21
Payer: MEDICARE

## 2024-03-21 VITALS
SYSTOLIC BLOOD PRESSURE: 137 MMHG | OXYGEN SATURATION: 97 % | BODY MASS INDEX: 38.62 KG/M2 | DIASTOLIC BLOOD PRESSURE: 86 MMHG | HEART RATE: 64 BPM | WEIGHT: 276.9 LBS | TEMPERATURE: 97.7 F

## 2024-03-21 DIAGNOSIS — Z86.79 HISTORY OF ATRIAL FIBRILLATION: ICD-10-CM

## 2024-03-21 DIAGNOSIS — D35.00 ADRENAL ADENOMA, UNSPECIFIED LATERALITY: ICD-10-CM

## 2024-03-21 DIAGNOSIS — R19.5 POSITIVE COLORECTAL CANCER SCREENING USING COLOGUARD TEST: Primary | ICD-10-CM

## 2024-03-21 DIAGNOSIS — Z79.01 CHRONIC ANTICOAGULATION: ICD-10-CM

## 2024-03-21 DIAGNOSIS — K63.5 BENIGN COLON POLYP: ICD-10-CM

## 2024-03-21 DIAGNOSIS — I10 ESSENTIAL HYPERTENSION: ICD-10-CM

## 2024-03-21 DIAGNOSIS — K57.30 SIGMOID DIVERTICULOSIS: ICD-10-CM

## 2024-03-21 DIAGNOSIS — E66.01 MORBID OBESITY (HCC): ICD-10-CM

## 2024-03-21 LAB — SURGICAL PATHOLOGY REPORT: NORMAL

## 2024-03-21 PROCEDURE — G8484 FLU IMMUNIZE NO ADMIN: HCPCS | Performed by: SURGERY

## 2024-03-21 PROCEDURE — 3017F COLORECTAL CA SCREEN DOC REV: CPT | Performed by: SURGERY

## 2024-03-21 PROCEDURE — 3079F DIAST BP 80-89 MM HG: CPT | Performed by: SURGERY

## 2024-03-21 PROCEDURE — 99214 OFFICE O/P EST MOD 30 MIN: CPT | Performed by: SURGERY

## 2024-03-21 PROCEDURE — 3075F SYST BP GE 130 - 139MM HG: CPT | Performed by: SURGERY

## 2024-03-21 PROCEDURE — 1036F TOBACCO NON-USER: CPT | Performed by: SURGERY

## 2024-03-21 PROCEDURE — G8417 CALC BMI ABV UP PARAM F/U: HCPCS | Performed by: SURGERY

## 2024-03-21 PROCEDURE — 1123F ACP DISCUSS/DSCN MKR DOCD: CPT | Performed by: SURGERY

## 2024-03-21 PROCEDURE — G8427 DOCREV CUR MEDS BY ELIG CLIN: HCPCS | Performed by: SURGERY

## 2024-03-21 NOTE — PATIENT INSTRUCTIONS
Patient will need right and transverse hemicolectomy with primary anastomosis in the near future pending clearance.         [facility] General Surgery  Mando Rojas MD, FACS  Liya Vo, APRN-CNP  [address] [place] [address], [address]  [place] [address]  Phone: [phone]  Fax: [phone]    Miralax (Polyethylene Glycol)  STOP Aspirin 7 Days prior to Procedure  STOP all other Blood Thinners 5 Days prior to Procedure    **DO NOT EAT ANY SOLID FOOD THE DAY BEFORE YOUR PROCEDURE**  **YOU MUST BE ON A CLEAR LIQUID DIET ONLY**    Approved Clear Liquids:  Any flavor of soda except Red or Purple  Fruit Juice without pulp  Coffee or tea without dairy products  Jell-O without fruit or toppings, No Red or Purple  Pop-olayinka or Italian Ice, No Red or Purple  Chicken or Beef broth and bouillon      DRINK PLENTY OF WATER THROUGHOUT THE DAY    At 10:00 am the day before your procedure, take all 4 Dulcolax Tablets.  At 6:00 pm the day before your procedure, mix the ENTIRE bottle of Miralax (238 gram or Close to it) with 64 ounces of Gatorade (NOT RED or PURPLE).  You must consume the entire 64 ounces by 8:00 pm.  Continue clear liquid diet up until midnight.    NOTHING TO EAT, DRINK, SMOKE, OR CHEW AFTER MIDNIGHT    You may brush your teeth, rinse, gargle, and spit.  Heart or Blood pressure medications ONLY with a small sip of water, unless otherwise directed.  You will be scheduled for a pre-admission phone call prior to your procedure for your chart to be reviewed with a nurse to give you more specific instructions.  Shower with regular soap and water.    YOU MUST HAVE AN ADULT EITHER DRIVE YOU OR RIDE IN A CAB WITH YOU

## 2024-04-01 ENCOUNTER — TELEPHONE (OUTPATIENT)
Age: 69
End: 2024-04-01

## 2024-04-01 DIAGNOSIS — Z01.818 PREOP EXAMINATION: Primary | ICD-10-CM

## 2024-04-01 RX ORDER — BISACODYL 5 MG/1
TABLET, DELAYED RELEASE ORAL
Qty: 4 TABLET | Refills: 0 | Status: SHIPPED | OUTPATIENT
Start: 2024-04-01

## 2024-04-01 RX ORDER — POLYETHYLENE GLYCOL 3350 17 G/17G
POWDER, FOR SOLUTION ORAL
Qty: 238 G | Refills: 0 | Status: SHIPPED | OUTPATIENT
Start: 2024-04-01

## 2024-04-01 NOTE — TELEPHONE ENCOUNTER
Called patient to schedule a procedure.    TM/ SC / TUESDAY 5/28/2024 at 7:00 am / OPEN RIGHT AND TRANSVERSE COLECTOMY/ ALEXANDER    PAT:5/14/2024 at 9:30 AM, at [facility]    Patient instructed to stop aspirin X-7 DAYS AND PLAVIX X-5 DAYS PRIOR TO PROCEDURE    PATIENT FULLY INSTRUCTED / MAILED     Clearances sent to Pravin Mendoza and Dr. Wilson on 4/1/2024      [facility]   MD Liya Barboza, NP  [address]  [address]  [place]  [address]  Office:  [phone]  Fax:  [phone]  Miralax (Polyethylene Glycol)  STOP Aspirin 7 Days prior to Procedure  STOP all other Blood Thinners 5 Days prior to Procedure      **DO NOT EAT ANY SOLID FOOD THE DAY BEFORE YOUR PROCEDURE**  **YOU MUST BE ON A CLEAR LIQUID DIET ONLY**    Approved Clear Liquids:  Any flavor of soda except Red or Purple  Fruit Juice without pulp  Coffee or tea without dairy products  Jell-O without fruit or toppings, No Red or Purple  Pop-olayinka or Italian Ice, No Red or Purple  Chicken or Beef broth and bouillon      DRINK PLENTY OF WATER THROUGHOUT THE DAY    At 10:00 am the day before your procedure, take all 4 Dulcolax Tablets.  At 6:00 pm the day before your procedure, mix the ENTIRE bottle of Miralax (238 gram or Close to it) with 64 ounces of Gatorade (NOT RED or PURPLE).  You must consume the entire 64 ounces by 8:00 pm.  Continue clear liquid diet up until midnight.    NOTHING TO EAT, DRINK, SMOKE, OR CHEW AFTER MIDNIGHT    You may brush your teeth, rinse, gargle, and spit.  Heart or Blood pressure medications ONLY with a small sip of water, unless otherwise directed.  Shower with regular soap and water.    YOU MUST HAVE AN ADULT EITHER DRIVE YOU RO RIDE IN A CAB WITH YOU    MY EXAM IS SCHEDULED FOR:  Date of Procedure: 5/28/2024   Time: 7:00 AM   Arrival Time:  5:30 m   Location:  [facility]  Your scripts have been sent to:  [facility]  Pre-Testing:

## 2024-04-01 NOTE — TELEPHONE ENCOUNTER
PAT orders per anesthesia protocol, additional orders placed.  Bowel prep sent to pharmacy to be taken day before surgery.  Pre-op antibiotics (IV Ancef ad Flagyl) ordered for day of surgery.  Signing encounter.

## 2024-04-02 NOTE — TELEPHONE ENCOUNTER
Called and left message with the patient that Pravin needs him to been seen for surgery clearance to be done.

## 2024-04-03 ENCOUNTER — OFFICE VISIT (OUTPATIENT)
Dept: PRIMARY CARE CLINIC | Age: 69
End: 2024-04-03
Payer: MEDICARE

## 2024-04-03 VITALS
DIASTOLIC BLOOD PRESSURE: 82 MMHG | SYSTOLIC BLOOD PRESSURE: 130 MMHG | WEIGHT: 278 LBS | OXYGEN SATURATION: 97 % | HEIGHT: 71 IN | BODY MASS INDEX: 38.92 KG/M2 | HEART RATE: 66 BPM

## 2024-04-03 DIAGNOSIS — I50.9 CHRONIC CONGESTIVE HEART FAILURE, UNSPECIFIED HEART FAILURE TYPE (HCC): ICD-10-CM

## 2024-04-03 DIAGNOSIS — I10 PRIMARY HYPERTENSION: ICD-10-CM

## 2024-04-03 DIAGNOSIS — Z01.818 PRE-OPERATIVE CLEARANCE: Primary | ICD-10-CM

## 2024-04-03 PROCEDURE — G8427 DOCREV CUR MEDS BY ELIG CLIN: HCPCS | Performed by: NURSE PRACTITIONER

## 2024-04-03 PROCEDURE — G8417 CALC BMI ABV UP PARAM F/U: HCPCS | Performed by: NURSE PRACTITIONER

## 2024-04-03 PROCEDURE — 3079F DIAST BP 80-89 MM HG: CPT | Performed by: NURSE PRACTITIONER

## 2024-04-03 PROCEDURE — 99213 OFFICE O/P EST LOW 20 MIN: CPT | Performed by: NURSE PRACTITIONER

## 2024-04-03 PROCEDURE — 1036F TOBACCO NON-USER: CPT | Performed by: NURSE PRACTITIONER

## 2024-04-03 PROCEDURE — 3017F COLORECTAL CA SCREEN DOC REV: CPT | Performed by: NURSE PRACTITIONER

## 2024-04-03 PROCEDURE — 1123F ACP DISCUSS/DSCN MKR DOCD: CPT | Performed by: NURSE PRACTITIONER

## 2024-04-03 PROCEDURE — 3075F SYST BP GE 130 - 139MM HG: CPT | Performed by: NURSE PRACTITIONER

## 2024-04-03 ASSESSMENT — ENCOUNTER SYMPTOMS
ABDOMINAL PAIN: 0
EYE REDNESS: 0
SORE THROAT: 0
SHORTNESS OF BREATH: 0
NAUSEA: 0
EYE DISCHARGE: 0
COUGH: 0
WHEEZING: 0
DIARRHEA: 0
RHINORRHEA: 0
VOMITING: 0

## 2024-04-03 NOTE — PROGRESS NOTES
[facility]  [facility] PRIMARY CARE  [address] [address]  Dept: [phone]    Last Crawley is a 68 y.o. male Established patient, who presents today for his medical conditions/complaints as noted below.      Chief Complaint   Patient presents with    Surgical Clearance       HPI:     HPI   Patient has open right and transverse colectomy scheduled for May 28 with Dr. Rojas.  He has evidence of suspicious polypoid masses ascending colon along with multiple other polyps       Reviewed prior notes:  general surgeon     Reviewed previous:  Labs and Imaging    LDL Cholesterol (mg/dL)   Date Value   10/08/2020 112   10/05/2020 113       (goal LDL is <100)   AST (U/L)   Date Value   10/25/2023 20     ALT (U/L)   Date Value   10/25/2023 15     BUN (mg/dL)   Date Value   10/25/2023 13     Hemoglobin A1C (%)   Date Value   10/05/2020 5.5     TSH (mIU/L)   Date Value   10/05/2020 3.07     BP Readings from Last 3 Encounters:   24 130/82   24 137/86   24 (!) 143/81          (goal 120/80)    Past Medical History:   Diagnosis Date    Arthritis     Atrial fibrillation (HCC)     Mixed hyperlipidemia 10/07/2020      Past Surgical History:   Procedure Laterality Date    CARDIAC CATHETERIZATION  10/07/2020    COLONOSCOPY          COLONOSCOPY N/A 3/18/2024    COLONOSCOPY POLYPECTOMY BIOPSY WITH INK INJECTION AT 58 FROM ANAL performed by Mando Rojas MD at [facility] ENDO    TONSILLECTOMY         Family History   Problem Relation Age of Onset    Cancer Father         sinus cancer     Diabetes Maternal Grandfather        Social History     Tobacco Use    Smoking status: Former     Current packs/day: 0.00     Average packs/day: 1 pack/day for 15.0 years (15.0 ttl pk-yrs)     Types: Cigarettes     Start date: 10/5/1960     Quit date: 10/5/1975     Years since quittin.5    Smokeless tobacco: Never   Substance Use Topics    Alcohol use: Not Currently      Current

## 2024-04-09 ENCOUNTER — TELEPHONE (OUTPATIENT)
Dept: PRIMARY CARE CLINIC | Age: 69
End: 2024-04-09

## 2024-04-09 DIAGNOSIS — K63.5 POLYP OF COLON, UNSPECIFIED PART OF COLON, UNSPECIFIED TYPE: Primary | ICD-10-CM

## 2024-04-09 NOTE — TELEPHONE ENCOUNTER
Patient requesting referral to Dr Clara UNDERWOOD [facility] [phone] to remove polyps in colon.  Found during colonoscopy.

## 2024-04-17 ENCOUNTER — TELEPHONE (OUTPATIENT)
Dept: GASTROENTEROLOGY | Age: 69
End: 2024-04-17

## 2024-04-17 ENCOUNTER — OFFICE VISIT (OUTPATIENT)
Dept: GASTROENTEROLOGY | Age: 69
End: 2024-04-17
Payer: MEDICARE

## 2024-04-17 VITALS
HEART RATE: 66 BPM | SYSTOLIC BLOOD PRESSURE: 118 MMHG | DIASTOLIC BLOOD PRESSURE: 76 MMHG | WEIGHT: 278 LBS | HEIGHT: 71 IN | BODY MASS INDEX: 38.92 KG/M2 | TEMPERATURE: 98.2 F

## 2024-04-17 DIAGNOSIS — D12.2 ADENOMATOUS POLYP OF ASCENDING COLON: Primary | ICD-10-CM

## 2024-04-17 PROCEDURE — G8417 CALC BMI ABV UP PARAM F/U: HCPCS | Performed by: INTERNAL MEDICINE

## 2024-04-17 PROCEDURE — 99204 OFFICE O/P NEW MOD 45 MIN: CPT | Performed by: INTERNAL MEDICINE

## 2024-04-17 PROCEDURE — G8427 DOCREV CUR MEDS BY ELIG CLIN: HCPCS | Performed by: INTERNAL MEDICINE

## 2024-04-17 PROCEDURE — 1036F TOBACCO NON-USER: CPT | Performed by: INTERNAL MEDICINE

## 2024-04-17 PROCEDURE — 3078F DIAST BP <80 MM HG: CPT | Performed by: INTERNAL MEDICINE

## 2024-04-17 PROCEDURE — 1123F ACP DISCUSS/DSCN MKR DOCD: CPT | Performed by: INTERNAL MEDICINE

## 2024-04-17 PROCEDURE — 3074F SYST BP LT 130 MM HG: CPT | Performed by: INTERNAL MEDICINE

## 2024-04-17 PROCEDURE — 3017F COLORECTAL CA SCREEN DOC REV: CPT | Performed by: INTERNAL MEDICINE

## 2024-04-17 RX ORDER — POLYETHYLENE GLYCOL 3350 17 G/17G
POWDER, FOR SOLUTION ORAL
Qty: 255 G | Refills: 0 | Status: SHIPPED | OUTPATIENT
Start: 2024-04-17

## 2024-04-17 RX ORDER — POLYETHYLENE GLYCOL 3350 17 G/17G
POWDER, FOR SOLUTION ORAL
Qty: 238 G | Refills: 0 | Status: SHIPPED | OUTPATIENT
Start: 2024-04-17

## 2024-04-17 RX ORDER — BISACODYL 5 MG/1
TABLET, DELAYED RELEASE ORAL
Qty: 4 TABLET | Refills: 0 | Status: SHIPPED | OUTPATIENT
Start: 2024-04-17

## 2024-04-17 NOTE — TELEPHONE ENCOUNTER
Procedure scheduled/Dr Hart  Procedure:colonoscopy  Dx: adenomatous polyp of ascending colon  Date:05/23/2024  Time:10:45 am / Arrive: 9:15 am  Hospital:[facility] phone call:they will call  Bowel Prep instructions given:Miralax/ dulcolax  In office/via phone: office  Clearance needed:Plavix

## 2024-04-17 NOTE — PROGRESS NOTES
Reason for Referral:   No referring provider defined for this encounter.    Chief Complaint   Patient presents with    New Patient     Second opinion on Dr Rojas -  surgery scheduled in May           HISTORY OF PRESENT ILLNESS: Mr.Randall Crawley is a 68 y.o. male with a pasthistory remarkable for A fib, referred for evaluation of colonic polyps.    He had cologuard test which was positive for which he had colonoscopy by Dr. Rojas who found a large polyp in ascending colon, biopsy showed tubular adenoma. He advised the patient to have right hemicolectomy done.   However, patient does not want colectomy and has come to our office for consideration of endoscopic removal of the polyp.    No c/o- nausea, vomiting, fever, loss of appetite, weight loss, bloating, bleeding MA, diarrhea, nocturnal diarrhea, tenesmus      Past Medical,Family, and Social History reviewed and does not contribute to the patient presentingcondition.     Ross dey's PMH/PSH,SH,PSYCH Hx, MEDs, ALLERGIES, and ROS were all reviewed and updated in the appropriate sections.    PAST MEDICAL HISTORY:  Past Medical History:   Diagnosis Date    Arthritis     Atrial fibrillation (HCC)     Mixed hyperlipidemia 10/07/2020       Past Surgical History:   Procedure Laterality Date    CARDIAC CATHETERIZATION  10/07/2020    COLONOSCOPY      2004    COLONOSCOPY N/A 3/18/2024    COLONOSCOPY POLYPECTOMY BIOPSY WITH INK INJECTION AT 58 FROM ANAL performed by Mando Rojas MD at [facility]    TONSILLECTOMY         CURRENT MEDICATIONS:    Current Outpatient Medications:     bisacodyl (DULCOLAX) 5 MG EC tablet, Take 4 tablets at 10:00 am day before procedure, Disp: 4 tablet, Rfl: 0    polyethylene glycol (MIRALAX) 17 GM/SCOOP powder, Mix 238 grams of Miralax with 64 ounces Gatorade (no red or purple) start drinking at 6:00 pm finish by 8:00 pm all of the prep, Disp: 238 g, Rfl: 0    clopidogrel (PLAVIX) 75 MG tablet, Take 1 tablet by mouth daily, Disp: 30 tablet,

## 2024-05-06 ENCOUNTER — TELEPHONE (OUTPATIENT)
Age: 69
End: 2024-05-06

## 2024-05-06 NOTE — TELEPHONE ENCOUNTER
Patient called and canceled surgery. Patient said he got a second opinion and stated it was   stated its not necessary for his surgery  and is canceling the surgery. Dr. Rojas informed.

## 2024-05-16 NOTE — DISCHARGE INSTRUCTIONS
Normal changes you may experience after a colonoscopy:  Passing of gas for several hours after  Some mild abdominal cramping  If a biopsy/ polypectomy was done, you may see some spotting of blood on the tissue when wiping  You may feel fatigued for the next 24-48 hours due to the preparation, sedation and procedure        Need to call for follow up appt. for in 3-4 weeks.   Activity   You have had anesthesia today  Do not drive, operate heavy equipment, consume alcoholic beverages, or make any important decisions  for 24 hours   Take your time changing positions today. You may feel light headed or dizzy if you move too quickly.   Rest for the next 24 hours.   If having severe gas pains, may use heating pad to abdomen, lay on left side. Getting up and walking helps.   Diet   You can eat your normal diet when you feel well. You should start off with bland foods like chicken soup, toast, or yogurt. Then advance as tolerated.  Drink plenty of fluids (unless your doctor tells you not to). Your urine should be very lightly colored without a strong odor.    Medicines   Continue your home medications as ordered by your physician.     Call your doctor now or seek immediate medical care if:   [phone]  You are passing blood rectally or vomiting blood (color of blood may be red or black)  Severe abdominal pain or tenderness (that is not relieved by passing air)   You have a fever, chills or excessive sweating   You have persistent nausea or vomiting   Redness or swelling at the IV site

## 2024-05-22 ENCOUNTER — ANESTHESIA EVENT (OUTPATIENT)
Dept: OPERATING ROOM | Age: 69
End: 2024-05-22
Payer: MEDICARE

## 2024-05-23 ENCOUNTER — HOSPITAL ENCOUNTER (OUTPATIENT)
Age: 69
Setting detail: OUTPATIENT SURGERY
Discharge: HOME | End: 2024-05-23
Attending: INTERNAL MEDICINE | Admitting: INTERNAL MEDICINE
Payer: MEDICARE

## 2024-05-23 ENCOUNTER — ANESTHESIA (OUTPATIENT)
Dept: OPERATING ROOM | Age: 69
End: 2024-05-23
Payer: MEDICARE

## 2024-05-23 VITALS
TEMPERATURE: 97.3 F | WEIGHT: 269.8 LBS | OXYGEN SATURATION: 98 % | HEART RATE: 65 BPM | BODY MASS INDEX: 38.63 KG/M2 | RESPIRATION RATE: 13 BRPM | SYSTOLIC BLOOD PRESSURE: 110 MMHG | DIASTOLIC BLOOD PRESSURE: 90 MMHG | HEIGHT: 70 IN

## 2024-05-23 DIAGNOSIS — D12.2 ADENOMATOUS POLYP OF ASCENDING COLON: ICD-10-CM

## 2024-05-23 PROCEDURE — C1889 IMPLANT/INSERT DEVICE, NOC: HCPCS | Performed by: INTERNAL MEDICINE

## 2024-05-23 PROCEDURE — 7100000000 HC PACU RECOVERY - FIRST 15 MIN: Performed by: INTERNAL MEDICINE

## 2024-05-23 PROCEDURE — 88305 TISSUE EXAM BY PATHOLOGIST: CPT

## 2024-05-23 PROCEDURE — 44604 SUTURE LARGE INTESTINE: CPT | Performed by: INTERNAL MEDICINE

## 2024-05-23 PROCEDURE — 3609010300 HC COLONOSCOPY W/BIOPSY SINGLE/MULTIPLE: Performed by: INTERNAL MEDICINE

## 2024-05-23 PROCEDURE — 3700000000 HC ANESTHESIA ATTENDED CARE: Performed by: INTERNAL MEDICINE

## 2024-05-23 PROCEDURE — 45385 COLONOSCOPY W/LESION REMOVAL: CPT | Performed by: INTERNAL MEDICINE

## 2024-05-23 PROCEDURE — 2720000010 HC SURG SUPPLY STERILE: Performed by: INTERNAL MEDICINE

## 2024-05-23 PROCEDURE — 6360000002 HC RX W HCPCS

## 2024-05-23 PROCEDURE — 7100000001 HC PACU RECOVERY - ADDTL 15 MIN: Performed by: INTERNAL MEDICINE

## 2024-05-23 PROCEDURE — 2709999900 HC NON-CHARGEABLE SUPPLY: Performed by: INTERNAL MEDICINE

## 2024-05-23 PROCEDURE — 3700000001 HC ADD 15 MINUTES (ANESTHESIA): Performed by: INTERNAL MEDICINE

## 2024-05-23 PROCEDURE — 2580000003 HC RX 258: Performed by: ANESTHESIOLOGY

## 2024-05-23 PROCEDURE — 2500000003 HC RX 250 WO HCPCS

## 2024-05-23 PROCEDURE — 7100000010 HC PHASE II RECOVERY - FIRST 15 MIN: Performed by: INTERNAL MEDICINE

## 2024-05-23 PROCEDURE — 45390 COLONOSCOPY W/RESECTION: CPT | Performed by: INTERNAL MEDICINE

## 2024-05-23 DEVICE — WORKING LENGTH 235CM, WORKING CHANNEL 2.8MM
Type: IMPLANTABLE DEVICE | Site: ASCENDING COLON | Status: FUNCTIONAL
Brand: RESOLUTION 360 CLIP

## 2024-05-23 RX ORDER — SODIUM CHLORIDE 0.9 % (FLUSH) 0.9 %
5-40 SYRINGE (ML) INJECTION PRN
Status: DISCONTINUED | OUTPATIENT
Start: 2024-05-23 | End: 2024-05-23 | Stop reason: HOSPADM

## 2024-05-23 RX ORDER — NALOXONE HYDROCHLORIDE 0.4 MG/ML
INJECTION, SOLUTION INTRAMUSCULAR; INTRAVENOUS; SUBCUTANEOUS PRN
Status: DISCONTINUED | OUTPATIENT
Start: 2024-05-23 | End: 2024-05-23 | Stop reason: HOSPADM

## 2024-05-23 RX ORDER — LABETALOL HYDROCHLORIDE 5 MG/ML
10 INJECTION, SOLUTION INTRAVENOUS
Status: DISCONTINUED | OUTPATIENT
Start: 2024-05-23 | End: 2024-05-23 | Stop reason: HOSPADM

## 2024-05-23 RX ORDER — SODIUM CHLORIDE 0.9 % (FLUSH) 0.9 %
5-40 SYRINGE (ML) INJECTION EVERY 12 HOURS SCHEDULED
Status: DISCONTINUED | OUTPATIENT
Start: 2024-05-23 | End: 2024-05-23 | Stop reason: HOSPADM

## 2024-05-23 RX ORDER — PHENYLEPHRINE HCL IN 0.9% NACL 1 MG/10 ML
SYRINGE (ML) INTRAVENOUS PRN
Status: DISCONTINUED | OUTPATIENT
Start: 2024-05-23 | End: 2024-05-23 | Stop reason: SDUPTHER

## 2024-05-23 RX ORDER — LIDOCAINE HYDROCHLORIDE 10 MG/ML
INJECTION, SOLUTION INFILTRATION; PERINEURAL PRN
Status: DISCONTINUED | OUTPATIENT
Start: 2024-05-23 | End: 2024-05-23 | Stop reason: SDUPTHER

## 2024-05-23 RX ORDER — SODIUM CHLORIDE, SODIUM LACTATE, POTASSIUM CHLORIDE, CALCIUM CHLORIDE 600; 310; 30; 20 MG/100ML; MG/100ML; MG/100ML; MG/100ML
INJECTION, SOLUTION INTRAVENOUS CONTINUOUS
Status: DISCONTINUED | OUTPATIENT
Start: 2024-05-23 | End: 2024-05-23 | Stop reason: HOSPADM

## 2024-05-23 RX ORDER — SODIUM CHLORIDE 9 MG/ML
INJECTION, SOLUTION INTRAVENOUS PRN
Status: DISCONTINUED | OUTPATIENT
Start: 2024-05-23 | End: 2024-05-23 | Stop reason: HOSPADM

## 2024-05-23 RX ORDER — PROCHLORPERAZINE EDISYLATE 5 MG/ML
5 INJECTION INTRAMUSCULAR; INTRAVENOUS
Status: DISCONTINUED | OUTPATIENT
Start: 2024-05-23 | End: 2024-05-23 | Stop reason: HOSPADM

## 2024-05-23 RX ORDER — LIDOCAINE HYDROCHLORIDE 10 MG/ML
1 INJECTION, SOLUTION EPIDURAL; INFILTRATION; INTRACAUDAL; PERINEURAL
Status: DISCONTINUED | OUTPATIENT
Start: 2024-05-23 | End: 2024-05-23 | Stop reason: HOSPADM

## 2024-05-23 RX ORDER — HYDRALAZINE HYDROCHLORIDE 20 MG/ML
10 INJECTION INTRAMUSCULAR; INTRAVENOUS
Status: DISCONTINUED | OUTPATIENT
Start: 2024-05-23 | End: 2024-05-23 | Stop reason: HOSPADM

## 2024-05-23 RX ORDER — PROPOFOL 10 MG/ML
INJECTION, EMULSION INTRAVENOUS PRN
Status: DISCONTINUED | OUTPATIENT
Start: 2024-05-23 | End: 2024-05-23 | Stop reason: SDUPTHER

## 2024-05-23 RX ADMIN — SODIUM CHLORIDE, POTASSIUM CHLORIDE, SODIUM LACTATE AND CALCIUM CHLORIDE: 600; 310; 30; 20 INJECTION, SOLUTION INTRAVENOUS at 09:38

## 2024-05-23 RX ADMIN — Medication 200 MCG: at 11:48

## 2024-05-23 RX ADMIN — Medication 100 MCG: at 11:38

## 2024-05-23 RX ADMIN — PROPOFOL 150 MCG/KG/MIN: 10 INJECTION, EMULSION INTRAVENOUS at 11:11

## 2024-05-23 RX ADMIN — Medication 100 MCG: at 11:24

## 2024-05-23 RX ADMIN — PROPOFOL 70 MG: 10 INJECTION, EMULSION INTRAVENOUS at 11:10

## 2024-05-23 RX ADMIN — Medication 100 MCG: at 11:33

## 2024-05-23 RX ADMIN — Medication 100 MCG: at 11:27

## 2024-05-23 RX ADMIN — Medication 200 MCG: at 11:57

## 2024-05-23 RX ADMIN — PROPOFOL 50 MG: 10 INJECTION, EMULSION INTRAVENOUS at 11:14

## 2024-05-23 RX ADMIN — LIDOCAINE HYDROCHLORIDE 40 MG: 10 INJECTION, SOLUTION INFILTRATION; PERINEURAL at 11:10

## 2024-05-23 RX ADMIN — Medication 200 MCG: at 11:42

## 2024-05-23 ASSESSMENT — PAIN - FUNCTIONAL ASSESSMENT: PAIN_FUNCTIONAL_ASSESSMENT: 0-10

## 2024-05-23 NOTE — ANESTHESIA PRE PROCEDURE
Department of Anesthesiology  Preprocedure Note       Name:  Last Crawley   Age:  68 y.o.  :  1955                                          MRN:  [id number]         Date:  2024      Surgeon: Surgeon(s):  Michael Hart MD    Procedure: Procedure(s):  COLONOSCOPY DIAGNOSTIC    Medications prior to admission:   Prior to Admission medications    Medication Sig Start Date End Date Taking? Authorizing Provider   polyethylene glycol (GLYCOLAX) 17 GM/SCOOP powder Dispense 4 Dulcolax tablets with this prescription.  Use as directed by following your patient instructions given by your physician. 24   Mcihael Hart MD   polyethylene glycol (GLYCOLAX) 17 GM/SCOOP powder Take as directed by physician office for bowel prep 24   Michael Hart MD   bisacodyl 5 MG EC tablet Take as directed by physician office for bowel prep 24   Michael Hart MD   bisacodyl (DULCOLAX) 5 MG EC tablet Take 4 tablets at 10:00 am day before procedure 24   Liya Vo APRN - CNP   polyethylene glycol (MIRALAX) 17 GM/SCOOP powder Mix 238 grams of Miralax with 64 ounces Gatorade (no red or purple) start drinking at 6:00 pm finish by 8:00 pm all of the prep 24   Liya Vo APRN - CNP   clopidogrel (PLAVIX) 75 MG tablet Take 1 tablet by mouth daily 3/25/24   Helga Dolan APRN - CNP   simvastatin (ZOCOR) 20 MG tablet Take 1 tablet by mouth daily 24   Lindsay Rodriguez MD   metoprolol succinate (TOPROL XL) 25 MG extended release tablet Take 1 tablet by mouth daily 23   Lindsay Rodriguez MD   nitroGLYCERIN (NITROSTAT) 0.4 MG SL tablet Place 1 tablet under the tongue every 5 minutes as needed for Chest pain up to max of 3 total doses. If no relief after 1 dose, call 911.  Patient not taking: Reported on 2024   Helga Dolan APRN - CNP   sacubitril-valsartan (ENTRESTO) 24-26 MG per tablet Take 1 tablet by mouth 2 times daily 23   Helga Dolan, APRN - CNP   aspirin 81 MG

## 2024-05-23 NOTE — ANESTHESIA POSTPROCEDURE EVALUATION
Department of Anesthesiology  Postprocedure Note    Patient: Last Crawley  MRN: [id number]  YOB: 1955  Date of evaluation: 5/23/2024    Procedure Summary       Date: 05/23/24 Room / Location: [facility] PROCEDURE ROOM / [facility]    Anesthesia Start: 1106 Anesthesia Stop: 1226    Procedure: COLONOSCOPY WITH HOT BIOPSY (Anus) Diagnosis:       Adenomatous polyp of ascending colon      (Adenomatous polyp of ascending colon [D12.2])    Surgeons: Michael Hart MD Responsible Provider: Bethel Coleman MD    Anesthesia Type: TIVA ASA Status: 3            Anesthesia Type: No value filed.    Sergio Phase I: Sergio Score: 6    Sergio Phase II:      Anesthesia Post Evaluation    Patient location during evaluation: PACU  Patient participation: complete - patient participated  Level of consciousness: awake and awake and alert  Pain score: 0  Nausea & Vomiting: no nausea and no vomiting  Cardiovascular status: hemodynamically stable and blood pressure returned to baseline  Respiratory status: acceptable  Hydration status: euvolemic  Multimodal analgesia pain management approach  Pain management: adequate    No notable events documented.

## 2024-05-23 NOTE — H&P
Procedure History and Physical    Pre-Procedural Diagnosis:  Colon polyp    Indications:  same    Procedure Planned: colonoscopy with EMR    History Obtained From:  patient    HISTORY OF PRESENT ILLNESS:       The patient is a 68 y.o. male who presents for the above procedure.        Past Medical History:    Past Medical History:   Diagnosis Date    Arthritis     Atrial fibrillation (HCC)     CHF (congestive heart failure) (HCC)     chronic    Mixed hyperlipidemia 10/07/2020       Past Surgical History:    Past Surgical History:   Procedure Laterality Date    CARDIAC CATHETERIZATION  10/07/2020    COLONOSCOPY      2004    COLONOSCOPY N/A 3/18/2024    COLONOSCOPY POLYPECTOMY BIOPSY WITH INK INJECTION AT 58 FROM ANAL performed by Mando Rojas MD at [facility]    TONSILLECTOMY         Medications:  Current Facility-Administered Medications   Medication Dose Route Frequency Provider Last Rate Last Admin    lidocaine PF 1 % injection 1 mL  1 mL IntraDERmal Once PRN Tu Cerda MD        lactated ringers IV soln infusion   IntraVENous Continuous Tu Cerda  mL/hr at 24 0955 NoRateChange at 24 0955    sodium chloride flush 0.9 % injection 5-40 mL  5-40 mL IntraVENous 2 times per day Tu Cerda MD        sodium chloride flush 0.9 % injection 5-40 mL  5-40 mL IntraVENous PRN Tu Cerda MD        0.9 % sodium chloride infusion   IntraVENous PRN Tu Cerda MD           Allergies:   Allergies   Allergen Reactions    No Known Allergies                  Social   Social History     Tobacco Use    Smoking status: Former     Current packs/day: 0.00     Average packs/day: 1 pack/day for 15.0 years (15.0 ttl pk-yrs)     Types: Cigarettes     Start date: 10/5/1960     Quit date: 10/5/1975     Years since quittin.6    Smokeless tobacco: Never   Substance Use Topics    Alcohol use: Not Currently        Family History   Problem Relation Age of Onset    Cancer

## 2024-05-23 NOTE — OP NOTE
normal    Descending/Sigmoid colon: normal    Rectum/Anus: examined in normal and retroflexed positions and was normal    Withdrawal Time was (minutes): 60 minutes    Diverticulosis: sigmoid    The colon was decompressed and the scope was removed.  The patient tolerated the procedure well.     Impression: Multiple ascending colon polyps S/P EMR and X-tack suture placement and hemoclipping    Recommendations/Plan:   Lifestyle and dietary modifications as discussed  F/U Biopsies  F/U In Office Yes  Discussed with the family  Repeat colonoscopy in 1 years    Electronically signed by Michael Hart MD  on 5/23/2024 at 12:22 PM

## 2024-05-29 LAB — SURGICAL PATHOLOGY REPORT: NORMAL

## 2024-06-06 DIAGNOSIS — D12.2 ADENOMATOUS POLYP OF ASCENDING COLON: ICD-10-CM

## 2024-07-25 ENCOUNTER — OFFICE VISIT (OUTPATIENT)
Dept: GASTROENTEROLOGY | Age: 69
End: 2024-07-25
Payer: MEDICARE

## 2024-07-25 VITALS
HEIGHT: 70 IN | BODY MASS INDEX: 41.03 KG/M2 | TEMPERATURE: 97 F | SYSTOLIC BLOOD PRESSURE: 139 MMHG | DIASTOLIC BLOOD PRESSURE: 87 MMHG | HEART RATE: 77 BPM | RESPIRATION RATE: 18 BRPM | WEIGHT: 286.6 LBS

## 2024-07-25 DIAGNOSIS — D12.2 ADENOMATOUS POLYP OF ASCENDING COLON: Primary | ICD-10-CM

## 2024-07-25 PROCEDURE — 99213 OFFICE O/P EST LOW 20 MIN: CPT | Performed by: INTERNAL MEDICINE

## 2024-07-25 PROCEDURE — G8427 DOCREV CUR MEDS BY ELIG CLIN: HCPCS | Performed by: INTERNAL MEDICINE

## 2024-07-25 PROCEDURE — 1036F TOBACCO NON-USER: CPT | Performed by: INTERNAL MEDICINE

## 2024-07-25 PROCEDURE — 3075F SYST BP GE 130 - 139MM HG: CPT | Performed by: INTERNAL MEDICINE

## 2024-07-25 PROCEDURE — 3079F DIAST BP 80-89 MM HG: CPT | Performed by: INTERNAL MEDICINE

## 2024-07-25 PROCEDURE — G8417 CALC BMI ABV UP PARAM F/U: HCPCS | Performed by: INTERNAL MEDICINE

## 2024-07-25 PROCEDURE — 3017F COLORECTAL CA SCREEN DOC REV: CPT | Performed by: INTERNAL MEDICINE

## 2024-07-25 PROCEDURE — 1123F ACP DISCUSS/DSCN MKR DOCD: CPT | Performed by: INTERNAL MEDICINE

## 2024-07-25 NOTE — PROGRESS NOTES
status: Former     Current packs/day: 0.00     Average packs/day: 1 pack/day for 15.0 years (15.0 ttl pk-yrs)     Types: Cigarettes     Start date: 10/5/1960     Quit date: 10/5/1975     Years since quittin.8    Smokeless tobacco: Never   Vaping Use    Vaping Use: Never used   Substance and Sexual Activity    Alcohol use: Not Currently    Drug use: Never    Sexual activity: Not on file   Other Topics Concern    Not on file   Social History Narrative    Not on file     Social Determinants of Health     Financial Resource Strain: Low Risk  (10/24/2023)    Overall Financial Resource Strain (CARDIA)     Difficulty of Paying Living Expenses: Not hard at all   Food Insecurity: Not on file (10/24/2023)   Transportation Needs: Unknown (10/24/2023)    PRAPARE - Transportation     Lack of Transportation (Medical): Not on file     Lack of Transportation (Non-Medical): No   Physical Activity: Inactive (2024)    Exercise Vital Sign     Days of Exercise per Week: 0 days     Minutes of Exercise per Session: 0 min   Stress: Not on file   Social Connections: Not on file   Intimate Partner Violence: Not on file   Housing Stability: Unknown (10/24/2023)    Housing Stability Vital Sign     Unable to Pay for Housing in the Last Year: Not on file     Number of Places Lived in the Last Year: Not on file     Unstable Housing in the Last Year: No       REVIEW OF SYSTEMS: A 12-point review of systemswas obtained and pertinent positives and negatives were enumerated above in the history of present illness. All other reviewed systems / symptoms were negative.    Review of Systems        LABORATORY DATA: Reviewed  Lab Results   Component Value Date    WBC 5.0 10/25/2023    HGB 15.0 10/25/2023    HCT 41.9 10/25/2023    MCV 93.5 10/25/2023     10/25/2023     10/25/2023    K 3.9 10/25/2023     10/25/2023    CO2 30 10/25/2023    BUN 13 10/25/2023    CREATININE 1.4 (H) 2023    BILITOT 1.0 10/25/2023    ALKPHOS 70

## 2024-07-29 ENCOUNTER — OFFICE VISIT (OUTPATIENT)
Dept: PRIMARY CARE CLINIC | Age: 69
End: 2024-07-29
Payer: MEDICARE

## 2024-07-29 VITALS
SYSTOLIC BLOOD PRESSURE: 130 MMHG | DIASTOLIC BLOOD PRESSURE: 89 MMHG | BODY MASS INDEX: 39.94 KG/M2 | HEIGHT: 70 IN | OXYGEN SATURATION: 97 % | WEIGHT: 279 LBS | HEART RATE: 78 BPM

## 2024-07-29 DIAGNOSIS — I50.9 CHRONIC CONGESTIVE HEART FAILURE, UNSPECIFIED HEART FAILURE TYPE (HCC): ICD-10-CM

## 2024-07-29 DIAGNOSIS — I10 PRIMARY HYPERTENSION: Primary | ICD-10-CM

## 2024-07-29 PROBLEM — F17.200 HAS BEEN SMOKING TOBACCO FOR 30 YEARS OR MORE: Status: RESOLVED | Noted: 2020-10-05 | Resolved: 2024-07-29

## 2024-07-29 PROCEDURE — 3017F COLORECTAL CA SCREEN DOC REV: CPT | Performed by: NURSE PRACTITIONER

## 2024-07-29 PROCEDURE — 1036F TOBACCO NON-USER: CPT | Performed by: NURSE PRACTITIONER

## 2024-07-29 PROCEDURE — 3075F SYST BP GE 130 - 139MM HG: CPT | Performed by: NURSE PRACTITIONER

## 2024-07-29 PROCEDURE — 99213 OFFICE O/P EST LOW 20 MIN: CPT | Performed by: NURSE PRACTITIONER

## 2024-07-29 PROCEDURE — 3079F DIAST BP 80-89 MM HG: CPT | Performed by: NURSE PRACTITIONER

## 2024-07-29 PROCEDURE — 1123F ACP DISCUSS/DSCN MKR DOCD: CPT | Performed by: NURSE PRACTITIONER

## 2024-07-29 PROCEDURE — G8417 CALC BMI ABV UP PARAM F/U: HCPCS | Performed by: NURSE PRACTITIONER

## 2024-07-29 PROCEDURE — G8427 DOCREV CUR MEDS BY ELIG CLIN: HCPCS | Performed by: NURSE PRACTITIONER

## 2024-07-29 ASSESSMENT — ENCOUNTER SYMPTOMS
DIARRHEA: 0
SHORTNESS OF BREATH: 1
COUGH: 0
ABDOMINAL PAIN: 0
CONSTIPATION: 0
EYE DISCHARGE: 0
BLURRED VISION: 0
RHINORRHEA: 0
BACK PAIN: 0
NAUSEA: 0
WHEEZING: 0
SORE THROAT: 0
EYE REDNESS: 0
VOMITING: 0

## 2024-07-29 NOTE — PROGRESS NOTES
[facility]  [facility]  [address] [address]  Dept: [phone]    Last Crawley is a 69 y.o. male Established patient, who presents today for his medical conditions/complaints as noted below.      Chief Complaint   Patient presents with    Hypertension    Knee Pain     Follow up       HPI:     Hypertension  This is a chronic problem. The current episode started more than 1 year ago. The problem is controlled. Associated symptoms include shortness of breath. Pertinent negatives include no anxiety, blurred vision, chest pain, headaches, malaise/fatigue, neck pain, palpitations, peripheral edema or sweats. There are no associated agents to hypertension. Risk factors for coronary artery disease include dyslipidemia and male gender. Past treatments include beta blockers and alpha 1 blockers. The current treatment provides significant improvement. Hypertensive end-organ damage includes heart failure.    He occasionally checks blood pressure at home an it runs 130's/80.    He complete colonoscopy tubular adenoma removed.    He has chronic pain in left knee.  Dr. Stallworth, Surgeon did not recommend injections. Surgeon recommend replace left knee. Patient want to wait.   He seldom takes an aspirin or acetaminophen for pain.    He expressed no concerns today    Reviewed prior notes:  gastroenterology    Reviewed previous:  Labs    No components found for: \"LDLCHOLESTEROL\", \"LDLCALC\"    (goal LDL is <100)   AST (U/L)   Date Value   10/25/2023 20     ALT (U/L)   Date Value   10/25/2023 15     BUN (mg/dL)   Date Value   10/25/2023 13     Hemoglobin A1C (%)   Date Value   10/05/2020 5.5     TSH (mIU/L)   Date Value   10/05/2020 3.07     BP Readings from Last 3 Encounters:   07/29/24 130/89   07/25/24 139/87   05/23/24 (!) 110/90          (goal 120/80)    Past Medical History:   Diagnosis Date    Arthritis     Atrial fibrillation (HCC)     CHF (congestive heart failure)

## 2024-08-26 ENCOUNTER — APPOINTMENT (OUTPATIENT)
Dept: CT IMAGING | Age: 69
End: 2024-08-26
Payer: MEDICARE

## 2024-08-26 ENCOUNTER — HOSPITAL ENCOUNTER (EMERGENCY)
Age: 69
Discharge: HOME | End: 2024-08-26
Attending: EMERGENCY MEDICINE
Payer: MEDICARE

## 2024-08-26 VITALS
WEIGHT: 280 LBS | RESPIRATION RATE: 18 BRPM | DIASTOLIC BLOOD PRESSURE: 84 MMHG | OXYGEN SATURATION: 97 % | HEIGHT: 70 IN | TEMPERATURE: 97.8 F | BODY MASS INDEX: 40.09 KG/M2 | SYSTOLIC BLOOD PRESSURE: 123 MMHG | HEART RATE: 73 BPM
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
## 2024-08-26 DIAGNOSIS — S01.01XA LACERATION OF SCALP, INITIAL ENCOUNTER: Primary | ICD-10-CM

## 2024-08-26 PROCEDURE — 99284 EMERGENCY DEPT VISIT MOD MDM: CPT

## 2024-08-26 PROCEDURE — 2500000003 HC RX 250 WO HCPCS: Performed by: EMERGENCY MEDICINE

## 2024-08-26 PROCEDURE — 72125 CT NECK SPINE W/O DYE: CPT

## 2024-08-26 PROCEDURE — 12002 RPR S/N/AX/GEN/TRNK2.6-7.5CM: CPT

## 2024-08-26 PROCEDURE — 70450 CT HEAD/BRAIN W/O DYE: CPT

## 2024-08-26 RX ORDER — LIDOCAINE HYDROCHLORIDE 10 MG/ML
5 INJECTION, SOLUTION INFILTRATION; PERINEURAL ONCE
Status: COMPLETED | OUTPATIENT
Start: 2024-08-26 | End: 2024-08-26

## 2024-08-26 RX ADMIN — LIDOCAINE HYDROCHLORIDE 5 ML: 10 INJECTION, SOLUTION INFILTRATION; PERINEURAL at 16:49

## 2024-08-26 ASSESSMENT — LIFESTYLE VARIABLES
HOW MANY STANDARD DRINKS CONTAINING ALCOHOL DO YOU HAVE ON A TYPICAL DAY: PATIENT DOES NOT DRINK
HOW OFTEN DO YOU HAVE A DRINK CONTAINING ALCOHOL: NEVER

## 2024-08-26 ASSESSMENT — ENCOUNTER SYMPTOMS
NAUSEA: 0
ABDOMINAL PAIN: 0
VOMITING: 0

## 2024-08-26 NOTE — ED TRIAGE NOTES
Mode of arrival (squad #, walk in, police, etc) : squad        Chief complaint(s): Mechanical fall; head laceration        Arrival Note (brief scenario, treatment PTA, etc).: Pt states he was cleaning his garage and tripped and fell while walking backwards. States he hit his head on something metal, did not lose consciousness. Pt is on blood thinners. A&O x4 and ambulatory.        C= \"Have you ever felt that you should Cut down on your drinking?\"  No  A= \"Have people Annoyed you by criticizing your drinking?\"  No  G= \"Have you ever felt bad or Guilty about your drinking?\"  No  E= \"Have you ever had a drink as an Eye-opener first thing in the morning to steady your nerves or to help a hangover?\"  No      Deferred []      Reason for deferring: N/A    *If yes to two or more: probable alcohol abuse.*

## 2024-08-26 NOTE — ED PROVIDER NOTES
EMERGENCY DEPARTMENT ENCOUNTER    Pt Name: Last Crawley  MRN: [id number]  Birthdate 1955  Date of evaluation: 8/26/24  CHIEF COMPLAINT       Chief Complaint   Patient presents with    Fall     mechanical    Laceration     Back of head     HISTORY OF PRESENT ILLNESS   Patient is presenting after a fall.  He was trying to remove a shelf when the shelf gave out and he fell backwards hitting the back of his head.  He has a laceration that was bandaged by EMS prior to his arrival.  Patient is on Plavix.  He is complaining of a headache.  He denies loss of consciousness.  He denies any other pain at this time.    The history is provided by the patient.           REVIEW OF SYSTEMS     Review of Systems   Constitutional:  Negative for chills and fever.   Cardiovascular:  Negative for chest pain.   Gastrointestinal:  Negative for abdominal pain, nausea and vomiting.   Genitourinary:  Negative for flank pain.   Musculoskeletal:  Negative for myalgias.   Skin:  Positive for wound.   Neurological:  Positive for headaches. Negative for dizziness, syncope, facial asymmetry, speech difficulty, weakness and light-headedness.     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

## 2024-09-04 ENCOUNTER — OFFICE VISIT (OUTPATIENT)
Dept: PRIMARY CARE CLINIC | Age: 69
End: 2024-09-04

## 2024-09-04 VITALS
SYSTOLIC BLOOD PRESSURE: 120 MMHG | HEIGHT: 70 IN | WEIGHT: 288 LBS | HEART RATE: 80 BPM | BODY MASS INDEX: 41.23 KG/M2 | OXYGEN SATURATION: 97 % | DIASTOLIC BLOOD PRESSURE: 70 MMHG

## 2024-09-04 DIAGNOSIS — S01.01XD LACERATION OF SCALP WITHOUT FOREIGN BODY, SUBSEQUENT ENCOUNTER: ICD-10-CM

## 2024-09-04 DIAGNOSIS — I50.22 CHRONIC SYSTOLIC HEART FAILURE (HCC): ICD-10-CM

## 2024-09-04 DIAGNOSIS — I48.91 NEW ONSET ATRIAL FIBRILLATION (HCC): Primary | ICD-10-CM

## 2024-09-04 DIAGNOSIS — E78.2 MIXED HYPERLIPIDEMIA: ICD-10-CM

## 2024-09-04 DIAGNOSIS — Z95.820 S/P ANGIOPLASTY WITH STENT: ICD-10-CM

## 2024-09-04 DIAGNOSIS — I50.9 CHRONIC CONGESTIVE HEART FAILURE, UNSPECIFIED HEART FAILURE TYPE (HCC): ICD-10-CM

## 2024-09-04 ASSESSMENT — ENCOUNTER SYMPTOMS
SHORTNESS OF BREATH: 0
CHEST TIGHTNESS: 0
COUGH: 0

## 2024-09-04 NOTE — PROGRESS NOTES
Flu vaccine (1) Never done    COVID-19 Vaccine (3 - 2023-24 season) 09/01/2024    GFR test (Diabetes, CKD 3-4, OR last GFR 15-59)  11/24/2024    Depression Screen  01/29/2025    Colorectal Cancer Screen  05/23/2034    Annual Wellness Visit (Medicare Advantage)  Completed    AAA screen  Completed    Hepatitis A vaccine  Aged Out    Hepatitis B vaccine  Aged Out    Hib vaccine  Aged Out    Polio vaccine  Aged Out    Meningococcal (ACWY) vaccine  Aged Out    Diabetes screen  Discontinued    Prostate Specific Antigen (PSA) Screening or Monitoring  Discontinued       Subjective:      Review of Systems   Constitutional:  Negative for chills, fever and unexpected weight change.   Respiratory:  Negative for cough, chest tightness and shortness of breath.    Cardiovascular:  Negative for chest pain, palpitations and leg swelling.   Musculoskeletal:  Negative for arthralgias.   Skin:  Positive for wound. Negative for rash.   Neurological:  Negative for dizziness, weakness, numbness and headaches.   Psychiatric/Behavioral:  Negative for dysphoric mood and sleep disturbance. The patient is not nervous/anxious.        Objective:     /70   Pulse 80   Ht 1.778 m (5' 10\")   Wt 130.6 kg (288 lb)   SpO2 97%   BMI 41.32 kg/m²   Physical Exam  Constitutional:       General: He is not in acute distress.     Appearance: Normal appearance. He is obese. He is not ill-appearing.   HENT:      Head: Normocephalic and atraumatic.        Comments: Well-healed laceration on posterior right scalp with 12 sutures in place.  No signs of infection or bleeding.     Right Ear: External ear normal.      Left Ear: External ear normal.      Nose: Nose normal.      Mouth/Throat:      Mouth: Mucous membranes are moist.   Neck:      Vascular: No carotid bruit.   Cardiovascular:      Rate and Rhythm: Normal rate and regular rhythm.      Pulses: Normal pulses.      Heart sounds: Normal heart sounds.   Pulmonary:      Effort: Pulmonary effort is

## 2025-01-27 ENCOUNTER — TELEPHONE (OUTPATIENT)
Dept: GASTROENTEROLOGY | Age: 70
End: 2025-01-27

## 2025-01-27 NOTE — TELEPHONE ENCOUNTER
Pt left voicemail message on 01/27/25 at 9:07 am regarding upcoming appt on 01/30/25. Pt has questions and is requesting a call back. Writer returned pt's phone call. Pt said he had a colonoscopy in July and was told to f/u in office in 6 months. Pt wanted to confirm this is not another colonoscopy and is just an office appt. Writer confirmed with pt this is just an office appointment, no procedure on this day. Pt did confirm him appt.

## 2025-01-28 RX ORDER — ISOSORBIDE MONONITRATE 30 MG/1
30 TABLET, EXTENDED RELEASE ORAL DAILY
Qty: 30 TABLET | Refills: 3 | Status: SHIPPED | OUTPATIENT
Start: 2025-01-28

## 2025-01-30 ENCOUNTER — TELEPHONE (OUTPATIENT)
Dept: GASTROENTEROLOGY | Age: 70
End: 2025-01-30

## 2025-01-30 ENCOUNTER — OFFICE VISIT (OUTPATIENT)
Dept: GASTROENTEROLOGY | Age: 70
End: 2025-01-30
Payer: MEDICARE

## 2025-01-30 ENCOUNTER — PREP FOR PROCEDURE (OUTPATIENT)
Dept: GASTROENTEROLOGY | Age: 70
End: 2025-01-30

## 2025-01-30 VITALS
WEIGHT: 273 LBS | HEIGHT: 70 IN | SYSTOLIC BLOOD PRESSURE: 118 MMHG | DIASTOLIC BLOOD PRESSURE: 69 MMHG | HEART RATE: 130 BPM | BODY MASS INDEX: 39.08 KG/M2

## 2025-01-30 DIAGNOSIS — D12.2 ADENOMATOUS POLYP OF ASCENDING COLON: ICD-10-CM

## 2025-01-30 DIAGNOSIS — D12.2 ADENOMATOUS POLYP OF ASCENDING COLON: Primary | ICD-10-CM

## 2025-01-30 PROCEDURE — 3078F DIAST BP <80 MM HG: CPT | Performed by: INTERNAL MEDICINE

## 2025-01-30 PROCEDURE — G8427 DOCREV CUR MEDS BY ELIG CLIN: HCPCS | Performed by: INTERNAL MEDICINE

## 2025-01-30 PROCEDURE — 3017F COLORECTAL CA SCREEN DOC REV: CPT | Performed by: INTERNAL MEDICINE

## 2025-01-30 PROCEDURE — 99214 OFFICE O/P EST MOD 30 MIN: CPT | Performed by: INTERNAL MEDICINE

## 2025-01-30 PROCEDURE — 1036F TOBACCO NON-USER: CPT | Performed by: INTERNAL MEDICINE

## 2025-01-30 PROCEDURE — 3074F SYST BP LT 130 MM HG: CPT | Performed by: INTERNAL MEDICINE

## 2025-01-30 PROCEDURE — G8417 CALC BMI ABV UP PARAM F/U: HCPCS | Performed by: INTERNAL MEDICINE

## 2025-01-30 PROCEDURE — 1159F MED LIST DOCD IN RCRD: CPT | Performed by: INTERNAL MEDICINE

## 2025-01-30 PROCEDURE — 1126F AMNT PAIN NOTED NONE PRSNT: CPT | Performed by: INTERNAL MEDICINE

## 2025-01-30 PROCEDURE — 1123F ACP DISCUSS/DSCN MKR DOCD: CPT | Performed by: INTERNAL MEDICINE

## 2025-01-30 RX ORDER — POLYETHYLENE GLYCOL 3350 17 G/17G
POWDER, FOR SOLUTION ORAL
Qty: 255 G | Refills: 0 | Status: SHIPPED | OUTPATIENT
Start: 2025-01-30

## 2025-01-30 NOTE — PROGRESS NOTES
GI CLINIC FOLLOW UP    INTERVAL HISTORY:   No referring provider defined for this encounter.    No chief complaint on file.          HISTORY OF PRESENT ILLNESS: Mr.Randall Crawley is a 69 y.o. male , referred for evaluation of colonic polyps.   He was seen for c/o- large colonic polyps for consideration of EMR.  We did EMR in May 2024. He is doing well.    No c/o- nausea, vomiting, fever, loss of appetite, weight loss, bloating, bleeding FL, diarrhea, nocturnal diarrhea, tenesmus      No c/o- nausea, vomiting, fever, loss of appetite, weight loss, bloating, bleeding FL, diarrhea, nocturnal diarrhea, tenesmus      Past Medical,Family, and Social History reviewed and does not contribute to the patient presentingcondition.    I did review all the labs results available for the labs which were ordered by the primary care physician, and the other consultants, we search on epic at [facility] and all the available care everywhere epic    I did review all the imaging studies of the abdomen available on EMR, ordered by the primary care physician and the other consultant    I did review all the pathology from the biopsies done on the previous endoscopies    Patient's PMH/PSH,SH,PSYCH Hx, MEDs, ALLERGIES, and ROS were all reviewed and updated in the appropriate sections.    PAST MEDICAL HISTORY:  Past Medical History:   Diagnosis Date    Arthritis     Atrial fibrillation (HCC)     CHF (congestive heart failure) (HCC)     chronic    Mixed hyperlipidemia 10/07/2020       Past Surgical History:   Procedure Laterality Date    CARDIAC CATHETERIZATION  10/07/2020    COLONOSCOPY      2004    COLONOSCOPY N/A 3/18/2024    COLONOSCOPY POLYPECTOMY BIOPSY WITH INK INJECTION AT 58 FROM ANAL performed by Mando Rojas MD at [facility] ENDO    COLONOSCOPY N/A 5/23/2024    COLONOSCOPY WITH HOT BIOPSY performed by Michael Hart MD at [facility] OR    TONSILLECTOMY         CURRENT MEDICATIONS:    Current Outpatient Medications:     isosorbide

## 2025-01-30 NOTE — TELEPHONE ENCOUNTER
Procedure scheduled/Dr Hart  Procedure: colonoscopy  Dx:  Adenomatous polyp of ascending colon   Date: 042425  Time: 11:00am/arrive 9:30am  Hospital: [facility] phone call: na  Bowel Prep instructions given: Miralax/Dul  In office/via phone: office  Clearance needed: Coumadin/Xarelto  GLP-1: none

## 2025-04-22 ENCOUNTER — ANESTHESIA EVENT (OUTPATIENT)
Dept: OPERATING ROOM | Age: 70
End: 2025-04-22
Payer: MEDICARE

## 2025-04-22 NOTE — PRE-PROCEDURE INSTRUCTIONS
VM left with pre-colonoscopy instructions:     Date/time/location (entrance C) of procedure     NPO after MN status     Need for  (X)     Need to complete bowel prep/instructions per Dr. Hart     Instructed pt to take BP meds with a small sip of water prior to procedure.    [facility] phone number [phone] provided for pt to call with any further questions prior to procedure.

## 2025-04-24 ENCOUNTER — HOSPITAL ENCOUNTER (OUTPATIENT)
Age: 70
Setting detail: OUTPATIENT SURGERY
Discharge: HOME | End: 2025-04-24
Attending: INTERNAL MEDICINE | Admitting: INTERNAL MEDICINE
Payer: MEDICARE

## 2025-04-24 ENCOUNTER — ANESTHESIA (OUTPATIENT)
Dept: OPERATING ROOM | Age: 70
End: 2025-04-24
Payer: MEDICARE

## 2025-04-24 VITALS
HEART RATE: 87 BPM | RESPIRATION RATE: 16 BRPM | SYSTOLIC BLOOD PRESSURE: 123 MMHG | WEIGHT: 249.4 LBS | HEIGHT: 67 IN | TEMPERATURE: 97.5 F | OXYGEN SATURATION: 98 % | DIASTOLIC BLOOD PRESSURE: 81 MMHG | BODY MASS INDEX: 39.15 KG/M2

## 2025-04-24 DIAGNOSIS — D12.2 ADENOMATOUS POLYP OF ASCENDING COLON: ICD-10-CM

## 2025-04-24 PROCEDURE — 93005 ELECTROCARDIOGRAM TRACING: CPT | Performed by: ANESTHESIOLOGY

## 2025-04-24 PROCEDURE — 2709999900 HC NON-CHARGEABLE SUPPLY: Performed by: INTERNAL MEDICINE

## 2025-04-24 PROCEDURE — 3700000000 HC ANESTHESIA ATTENDED CARE: Performed by: INTERNAL MEDICINE

## 2025-04-24 PROCEDURE — 6360000002 HC RX W HCPCS: Performed by: NURSE ANESTHETIST, CERTIFIED REGISTERED

## 2025-04-24 PROCEDURE — 7100000010 HC PHASE II RECOVERY - FIRST 15 MIN: Performed by: INTERNAL MEDICINE

## 2025-04-24 PROCEDURE — 45385 COLONOSCOPY W/LESION REMOVAL: CPT | Performed by: INTERNAL MEDICINE

## 2025-04-24 PROCEDURE — 2500000003 HC RX 250 WO HCPCS: Performed by: NURSE ANESTHETIST, CERTIFIED REGISTERED

## 2025-04-24 PROCEDURE — 2580000003 HC RX 258: Performed by: ANESTHESIOLOGY

## 2025-04-24 PROCEDURE — 3700000001 HC ADD 15 MINUTES (ANESTHESIA): Performed by: INTERNAL MEDICINE

## 2025-04-24 PROCEDURE — 88305 TISSUE EXAM BY PATHOLOGIST: CPT

## 2025-04-24 PROCEDURE — 7100000011 HC PHASE II RECOVERY - ADDTL 15 MIN: Performed by: INTERNAL MEDICINE

## 2025-04-24 PROCEDURE — 3609010700 HC COLONOSCOPY POLYPECTOMY REMOVAL SNARE/STOMA: Performed by: INTERNAL MEDICINE

## 2025-04-24 RX ORDER — MORPHINE SULFATE 2 MG/ML
1 INJECTION, SOLUTION INTRAMUSCULAR; INTRAVENOUS EVERY 5 MIN PRN
Status: DISCONTINUED | OUTPATIENT
Start: 2025-04-24 | End: 2025-04-24 | Stop reason: HOSPADM

## 2025-04-24 RX ORDER — MIDAZOLAM HYDROCHLORIDE 2 MG/2ML
2 INJECTION, SOLUTION INTRAMUSCULAR; INTRAVENOUS
Status: DISCONTINUED | OUTPATIENT
Start: 2025-04-24 | End: 2025-04-24 | Stop reason: HOSPADM

## 2025-04-24 RX ORDER — SODIUM CHLORIDE 0.9 % (FLUSH) 0.9 %
5-40 SYRINGE (ML) INJECTION EVERY 12 HOURS SCHEDULED
Status: DISCONTINUED | OUTPATIENT
Start: 2025-04-24 | End: 2025-04-24 | Stop reason: HOSPADM

## 2025-04-24 RX ORDER — SODIUM CHLORIDE 0.9 % (FLUSH) 0.9 %
5-40 SYRINGE (ML) INJECTION PRN
Status: DISCONTINUED | OUTPATIENT
Start: 2025-04-24 | End: 2025-04-24 | Stop reason: HOSPADM

## 2025-04-24 RX ORDER — PROPOFOL 10 MG/ML
INJECTION, EMULSION INTRAVENOUS
Status: DISCONTINUED | OUTPATIENT
Start: 2025-04-24 | End: 2025-04-24 | Stop reason: SDUPTHER

## 2025-04-24 RX ORDER — SODIUM CHLORIDE 9 MG/ML
INJECTION, SOLUTION INTRAVENOUS PRN
Status: DISCONTINUED | OUTPATIENT
Start: 2025-04-24 | End: 2025-04-24 | Stop reason: HOSPADM

## 2025-04-24 RX ORDER — MEPERIDINE HYDROCHLORIDE 50 MG/ML
12.5 INJECTION INTRAMUSCULAR; INTRAVENOUS; SUBCUTANEOUS ONCE
Status: DISCONTINUED | OUTPATIENT
Start: 2025-04-24 | End: 2025-04-24 | Stop reason: HOSPADM

## 2025-04-24 RX ORDER — DIPHENHYDRAMINE HYDROCHLORIDE 50 MG/ML
12.5 INJECTION, SOLUTION INTRAMUSCULAR; INTRAVENOUS
Status: DISCONTINUED | OUTPATIENT
Start: 2025-04-24 | End: 2025-04-24 | Stop reason: HOSPADM

## 2025-04-24 RX ORDER — SODIUM CHLORIDE 9 MG/ML
INJECTION, SOLUTION INTRAVENOUS CONTINUOUS
Status: DISCONTINUED | OUTPATIENT
Start: 2025-04-24 | End: 2025-04-24 | Stop reason: HOSPADM

## 2025-04-24 RX ORDER — LABETALOL HYDROCHLORIDE 5 MG/ML
10 INJECTION, SOLUTION INTRAVENOUS
Status: DISCONTINUED | OUTPATIENT
Start: 2025-04-24 | End: 2025-04-24 | Stop reason: HOSPADM

## 2025-04-24 RX ORDER — ONDANSETRON 2 MG/ML
4 INJECTION INTRAMUSCULAR; INTRAVENOUS
Status: DISCONTINUED | OUTPATIENT
Start: 2025-04-24 | End: 2025-04-24 | Stop reason: HOSPADM

## 2025-04-24 RX ORDER — NALOXONE HYDROCHLORIDE 0.4 MG/ML
INJECTION, SOLUTION INTRAMUSCULAR; INTRAVENOUS; SUBCUTANEOUS PRN
Status: DISCONTINUED | OUTPATIENT
Start: 2025-04-24 | End: 2025-04-24 | Stop reason: HOSPADM

## 2025-04-24 RX ORDER — HYDRALAZINE HYDROCHLORIDE 20 MG/ML
10 INJECTION INTRAMUSCULAR; INTRAVENOUS
Status: DISCONTINUED | OUTPATIENT
Start: 2025-04-24 | End: 2025-04-24 | Stop reason: HOSPADM

## 2025-04-24 RX ORDER — LIDOCAINE HYDROCHLORIDE 10 MG/ML
INJECTION, SOLUTION INFILTRATION; PERINEURAL
Status: DISCONTINUED | OUTPATIENT
Start: 2025-04-24 | End: 2025-04-24 | Stop reason: SDUPTHER

## 2025-04-24 RX ORDER — METOPROLOL TARTRATE 1 MG/ML
INJECTION, SOLUTION INTRAVENOUS
Status: DISCONTINUED | OUTPATIENT
Start: 2025-04-24 | End: 2025-04-24 | Stop reason: SDUPTHER

## 2025-04-24 RX ORDER — METOCLOPRAMIDE HYDROCHLORIDE 5 MG/ML
10 INJECTION INTRAMUSCULAR; INTRAVENOUS
Status: DISCONTINUED | OUTPATIENT
Start: 2025-04-24 | End: 2025-04-24 | Stop reason: HOSPADM

## 2025-04-24 RX ORDER — OXYCODONE HYDROCHLORIDE 5 MG/1
10 TABLET ORAL PRN
Status: DISCONTINUED | OUTPATIENT
Start: 2025-04-24 | End: 2025-04-24 | Stop reason: HOSPADM

## 2025-04-24 RX ORDER — OXYCODONE HYDROCHLORIDE 5 MG/1
5 TABLET ORAL PRN
Status: DISCONTINUED | OUTPATIENT
Start: 2025-04-24 | End: 2025-04-24 | Stop reason: HOSPADM

## 2025-04-24 RX ORDER — SODIUM CHLORIDE, SODIUM LACTATE, POTASSIUM CHLORIDE, CALCIUM CHLORIDE 600; 310; 30; 20 MG/100ML; MG/100ML; MG/100ML; MG/100ML
INJECTION, SOLUTION INTRAVENOUS CONTINUOUS
Status: DISCONTINUED | OUTPATIENT
Start: 2025-04-24 | End: 2025-04-24 | Stop reason: HOSPADM

## 2025-04-24 RX ADMIN — SODIUM CHLORIDE, POTASSIUM CHLORIDE, SODIUM LACTATE AND CALCIUM CHLORIDE: 600; 310; 30; 20 INJECTION, SOLUTION INTRAVENOUS at 10:17

## 2025-04-24 RX ADMIN — PROPOFOL 50 MG: 10 INJECTION, EMULSION INTRAVENOUS at 10:20

## 2025-04-24 RX ADMIN — PROPOFOL 120 MCG/KG/MIN: 10 INJECTION, EMULSION INTRAVENOUS at 10:21

## 2025-04-24 RX ADMIN — LIDOCAINE HYDROCHLORIDE 50 MG: 10 INJECTION, SOLUTION INFILTRATION; PERINEURAL at 10:20

## 2025-04-24 RX ADMIN — METOPROLOL TARTRATE 2 MG: 5 INJECTION INTRAVENOUS at 10:25

## 2025-04-24 ASSESSMENT — PAIN - FUNCTIONAL ASSESSMENT: PAIN_FUNCTIONAL_ASSESSMENT: NONE - DENIES PAIN

## 2025-04-24 NOTE — DISCHARGE INSTRUCTIONS

## 2025-04-24 NOTE — ANESTHESIA PRE PROCEDURE
Department of Anesthesiology  Preprocedure Note       Name:  Last Crawley   Age:  69 y.o.  :  1955                                          MRN:  [id number]         Date:  2025      Surgeon: Surgeon(s):  Michael Hart MD    Procedure: Procedure(s):  COLONOSCOPY DIAGNOSTIC    Medications prior to admission:   Prior to Admission medications    Medication Sig Start Date End Date Taking? Authorizing Provider   polyethylene glycol (GLYCOLAX) 17 GM/SCOOP powder Dispense 4 Dulcolax tablets with this prescription.  Use as directed by following your patient instructions given by your physician. 25   Michael Hart MD   isosorbide mononitrate (IMDUR) 30 MG extended release tablet Take 1 tablet by mouth daily 25   Geni Lopez APRN - CNP   clopidogrel (PLAVIX) 75 MG tablet Take 1 tablet by mouth once daily 25   Lindsay Rodriguez MD   nitroGLYCERIN (NITROSTAT) 0.4 MG SL tablet Place 1 tablet under the tongue every 5 minutes as needed for Chest pain up to max of 3 total doses. If no relief after 1 dose, call 911. 1/3/25   Geni Lopez APRN - CNP   rivaroxaban (XARELTO) 20 MG TABS tablet Take 1 tablet by mouth daily (with breakfast) 1/3/25   Geni Lopez APRN - CNP   simvastatin (ZOCOR) 40 MG tablet Take 1 tablet by mouth nightly 24   Lindsay Rodriguez MD   metoprolol succinate (TOPROL XL) 25 MG extended release tablet Take 1 tablet by mouth once daily 24   Lindsay Rodriguez MD   sacubitril-valsartan (ENTRESTO) 24-26 MG per tablet Take 1 tablet by mouth 2 times daily 24   Helga Dolan APRN - CNP   bisacodyl (DULCOLAX) 5 MG EC tablet Take 4 tablets at 10:00 am day before procedure 24   Liya Vo APRN - CNP   polyethylene glycol (MIRALAX) 17 GM/SCOOP powder Mix 238 grams of Miralax with 64 ounces Gatorade (no red or purple) start drinking at 6:00 pm finish by 8:00 pm all of the prep 24   Liya Vo, APRN - CNP   Multiple Vitamins-Minerals (THERAPEUTIC

## 2025-04-24 NOTE — OP NOTE
PROCEDURE NOTE    DATE OF PROCEDURE: 4/24/2025    SURGEON: Michael Hart MD  Facility : [facility]   ASSISTANT: None  Anesthesia: Monitored anesthesia care  PREOPERATIVE DIAGNOSIS: H/o- polyps for surveillance    POSTOPERATIVE DIAGNOSIS: as described below    OPERATION: Total colonoscopy     ANESTHESIA: Moderate Sedation    ESTIMATED BLOOD LOSS: less than 50     COMPLICATIONS: None.     SPECIMENS:  Was Obtained: colon polyps    HISTORY: The patient is a 69 y.o. year old male with history of above preop diagnosis.  I recommended colonoscopy with possible biopsy or polypectomy and I explained the risk, benefits, expected outcome, and alternatives to the procedure.  Risks included but are not limited to bleeding, infection, respiratory distress, hypotension, and perforation of the colon and possibility of missing a lesion.  The patient understands and is in agreement.        PROCEDURE: The patient was given IV conscious sedation.  The patient's SPO2 remained above 90% throughout the procedure.     Digital rectal exam- normal    The colonoscope was inserted per rectum and advanced under direct vision to the cecum without difficulty.      Post sedation note :The patient's SPO2 remained above 90% throughout the procedure.the vital signs remained stable , and no immediate complication form the procedure noted, patient will be ready for d/c when criteria is met .        The prep was excellent.      Findings:  Terminal ileum: Distil 2 cm examined normal    Cecum: normal    Ascending colon: abnormal: two small polyps, 3 mm each removed with cold snare    Transverse colon: abnormal: 7 mm polyp removed with hot snare    Descending/Sigmoid colon: normal    Rectum/Anus: examined in normal and retroflexed positions and was normal; hemorrhoids small    Withdrawal Time was (minutes): 11    Diverticulosis: sigmoid (severe)    The colon was decompressed and the scope was removed.  The patient tolerated the procedure well.      Impression: Ascending and transverse colon polyps S/P polypectomy    Recommendations/Plan:   Lifestyle and dietary modifications as discussed  Continue current medications  F/U Biopsies  F/U In Office Yes  Repeat colonoscopy in 3 years    Electronically signed by Michael Hart MD  on 4/24/2025 at 10:39 AM

## 2025-04-24 NOTE — H&P
Procedure History and Physical    Pre-Procedural Diagnosis:  H/o- colonic polyps for surveillance    Indications:  same    Procedure Planned: colonoscopy     History Obtained From:  patient    HISTORY OF PRESENT ILLNESS:       The patient is a 69 y.o. male who presents for the above procedure.        Past Medical History:    Past Medical History:   Diagnosis Date    Arthritis     Atrial fibrillation (HCC)     CHF (congestive heart failure) (HCC)     chronic    Mixed hyperlipidemia 10/07/2020       Past Surgical History:    Past Surgical History:   Procedure Laterality Date    CARDIAC CATHETERIZATION  10/07/2020    COLONOSCOPY      2004    COLONOSCOPY N/A 3/18/2024    COLONOSCOPY POLYPECTOMY BIOPSY WITH INK INJECTION AT 58 FROM ANAL performed by Mando Rojas MD at [email or web address] ENDO    COLONOSCOPY N/A 2024    COLONOSCOPY WITH HOT BIOPSY performed by Michael Hart MD at [facility] OR    TONSILLECTOMY         Medications:  Current Facility-Administered Medications   Medication Dose Route Frequency Provider Last Rate Last Admin    0.9 % sodium chloride infusion   IntraVENous Continuous Jada Matson MD        lactated ringers infusion   IntraVENous Continuous Jada Matson MD        sodium chloride flush 0.9 % injection 5-40 mL  5-40 mL IntraVENous 2 times per day Jada Matson MD        sodium chloride flush 0.9 % injection 5-40 mL  5-40 mL IntraVENous PRN Jada Matson MD        0.9 % sodium chloride infusion   IntraVENous PRN Jada Matson MD           Allergies:   Allergies   Allergen Reactions    No Known Allergies                  Social   Social History     Tobacco Use    Smoking status: Former     Current packs/day: 0.00     Average packs/day: 1 pack/day for 15.0 years (15.0 ttl pk-yrs)     Types: Cigarettes     Start date: 10/5/1960     Quit date: 10/5/1975     Years since quittin.5    Smokeless tobacco: Never   Substance Use Topics    Alcohol use: Not Currently        Family History   Problem Relation Age of  Outpatient Medical Nutrition Therapy Note    The patient was seen for Obesity in an outpatient setting. The instruction was provided to the patient via Phone encounter.  Consent for virtual visit, to bill insurance, and to share information with other providers was received.    Readiness to Learning:  Readiness to learn: The patient demonstrates the ability to understand and asks questions.  The barriers to self-care and learning limitations were assessed as none    Patient wants to change relationship with food and how/what she eats and lose weight.     Assessment:  Patient Active Problem List   Diagnosis   • Vitamin D deficiency       Current Outpatient Medications   Medication Sig Dispense Refill   • Cholecalciferol (Vitamin D3) 125 mcg (5,000 units) chew tab Chew 125 mcg by mouth daily.     • dicyclomine (BENTYL) 10 MG capsule Take 10 mg by mouth 4 times daily (before meals and nightly).     • triamcinolone (ARISTOCORT) 0.1 % cream Apply 1 application topically 3 times daily. 15 g 0   • busPIRone (BUSPAR) 15 MG tablet Take 1 tablet by mouth daily. 30 tablet 1   • vitamin D3 (CHOLECALCIFEROL) 1.25 mg (50,000 units) capsule Take 1 capsule by mouth 1 day a week. 12 capsule 3   • acetaminophen-codeine (TYLENOL NO.3) 300-30 MG per tablet TAKE 1 TABLET BY MOUTH EVERY 6 HOURS AS NEEDED FOR 10 DAYS     • cyclobenzaprine (FLEXERIL) 10 MG tablet TAKE 1 TABLET BY MOUTH THREE TIMES DAILY FOR 10 DAYS AS NEEDED       No current facility-administered medications for this visit.       ALLERGIES:  No Known Allergies  Recent diet changes: Patient has recently tried to find lower cholesterol options and cut out bread and fried foods (certain foods are triggers for stomach problems)  Restaurant/Fast-food frequency: 3-4x per week for dinner  Has nuts and green tea during the work day  (5 days per week)    Grocery shopping/cooking: Self for both     Typical breakfast time: none  Typical breakfast intake: none    Typical lunch time:  Onset    Cancer Father         sinus cancer     Diabetes Maternal Grandfather       No family history of colon cancer, Crohn's disease, or ulcerative colitis    Problems with Sedation/Anesthesia in the past? no    REVIEW OF SYSTEMS:  12 point review of systems negative other than mentioned above.      PHYSICAL EXAM:    Vitals:  There were no vitals taken for this visit.    Focused Exam related to procedure:    General appearance: NAD, conversant   Eyes: anicteric sclerae, moist conjunctivae; no lid-lag; PERRLA   Lungs: CTA, with normal respiratory effort and no intercostal retractions   CV: RRR, no MRGs   Abdomen: Soft, non-tender; no masses or HSM   Skin: Normal temperature, turgor and texture; no rash, ulcers or subcutaneous nodules     DATA:  CBC:   Lab Results   Component Value Date    WBC 5.0 10/25/2023    HGB 15.0 10/25/2023    HCT 41.9 10/25/2023    MCV 93.5 10/25/2023     10/25/2023     BUN/Cr:   Lab Results   Component Value Date    BUN 13 10/25/2023   ,   Lab Results   Component Value Date    CREATININE 1.4 (H) 11/24/2023     Potassium:   Lab Results   Component Value Date    K 3.9 10/25/2023     PT/INR:   Lab Results   Component Value Date    INR 1.1 10/06/2020    INR 1.1 10/05/2020    PROTIME 14.4 10/06/2020    PROTIME 13.9 10/05/2020       ASSESSMENT AND PLAN:       1.  Patient is a 69 y.o. male with above specified procedure planned.  Expected Sedation/Anesthesia Type: MAC    2.  ASA (American Society Anesthesiology) Anesthesia Status: Class 2 - A normal healthy patient with mild systemic disease    3. Mallampati: II (soft palate, uvula, fauces visible)  4.  Procedure options, risks and benefits reviewed with Patient.  Patient expresses understanding.    5.  Consent has been signed:  Yes    Michael Hart MD        none  Typical lunch intake: none    Typical dinner time: 4 or 5 starts cooking  Typical dinner intake: veggie stir august with cauliflower rice & chicken OR ground turkey meal like chili soup with ground turkey in it, OR order chinese or Subway sandwich or salad OR pizza/wings or potatoes/fries    Typical snack times: 3 pm  Typical snack intake: packet of nuts at 3 pm     Types of beverages: green tea or energy drink 50 kcals, says she \"doesn't drink enough water (feels it is a trigger for her stomach issues)\", 5 kcal \"Ice Drink\" flavored water, barely any coffee  Alcohol consumption: wine or vodka (mixed drink) 2-3 per evening     Vitamin/mineral/herbal supplements: vit d3 and daily probiotic and fiber (recommend by gi doctor) has been taking the past few months    Physical activity: previously wasn't but now tries to get about 30 min to 1 hour walk on days off. Walks a lot at work. Occasionally does 30 min walk after work (2 days per week usually).      7/7/22: Patient reports she has trained herself through the years to not eat much during the day for fear of needing to rush to the restroom which is not easy to do when she's at work 5 days per week from around 10-7 pm. She reports having a history of disordered eating. She currently only eats a pack of nuts around 3 pm and all other consumption is in the evenings after work. Even on days off she mostly waits to eat. Patient reports that she is working with a  doctor on the issues she is having. She is hoping to have a colonoscopy soon. She reports she started the fiber and probiotic supplement but isn't sure that it has been helpful or not. Will f/u next month for more education.     8/11/22: Patient said she has not been doing very well with diet or physical activity. She admits that with summer plans she has eaten out more and not been focusing on healthy eating and increasing activity. Patient plans to begin getting more serious about it as summer is coming to  a close. Discussed tips for carving out time in advance and making better choices. Also discussed reducing guilt surrounding the less healthy choices she has made.     10/6/22: Patient said she had her colonoscopy and lab work. She said no issues found in during colonoscopy which was a little frustrating because she was hopeful for some answers to her GI issues. She said she is more sure now that it is just food/alcohol related. Her schedule changed at work and so she has been eating from home more. She has been trying to model the plate guideline. Patient has not gone to the gym. She says she simply has not put any effort towards it. We discussed this and RD gave tips for making it work in her lifestyle. Also discussed benefits especially as it relates to taking the place of drinking during the week to cope with stress from work. We spent a lot of time discussing feelings towards/relationship with food. Patient would like to drink more water and plans to have a bottle per work day. Will f/u next month.     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 Patient craves carbs when she's starting her period. We got off topic but will plan to discuss healthy carb intake for cravings at next appt. Patient started going to the gym 2x per week. She is enjoying the way she feels afterward but does not enjoy the exercise during. We talked about other options such as a class environment (cycling, becki, pilates, etc.). Patient said she thinks it could be a great idea and something she plans to look into after this busy season for work around the holidays. Her current schedule is not consistent enough to commit to a weekly scheduled class. We reviewed cholesterol lowering techniques and I provided patient with Heart Healthy Diet handout as well as Med. Food Guide Pyramid and High Fiber Nutrition Therapy handout. We reviewed  positive impact of fiber on cholesterol. We reviewed importance of physical activity. We also discussed vegetarianism. Patient was a vegetarian for about 7 years (in the past) and is interested in trying to get back to that. She is interested in being a pescatarian because she wants to include fish in her diet. We reviewed highlights of mediterranean diet. Patient said she wants to get serious about finding a therapist. She has not been able to reduce drinking and binge eating around period and during times of stress. She wants to work with a therapist to make some progress in these areas as she realizes the nutritional implications. Will f/u in December.    12/27: Patient has been struggling through the holidays with work. She has not made it to the gym and she has been ordering out more in general. She has not yet found a therapist. She has good intentions now that she is through Thanksgiving and Isaias. She has been trying to eat something small during the day and drink more water during the day. She has noticed that when she doesn't drink enough water during the day that she will wake up overnight. I encouraged her to get back on track and to give herself some jose manuel in November and December when things are very hectic for her with work and in general in life. She reports maintaining weight during these past couple months where in the past she would always gain 3-5# during the holidays. Praised patient for enjoying the holiday foods without over indulging. Also praised patient as she does report she has been focusing on one of her personal goals to increase vegetarian meals. Will f/u in February per patient request.      Anthropometric Measurements:  Ht Readings from Last 1 Encounters:   05/17/22 5' 2\" (1.575 m)     Wt Readings from Last 1 Encounters:   06/28/22 83.2 kg (183 lb 6.4 oz)     BMI Readings from Last 1 Encounters:   06/28/22 33.54 kg/m²          BMI classification: obese (BMI 30 - 34.9)  UBW: 180#  is mostly usual   Recent weight change: 15# gain when out of work in 2020, but has lost 10# since then      185 1 month ago and now back to 180# (at 10/22 appt).    Weight loss desired?: yes  Goal weight: 150#  Previous weight loss methods/issues: keto     Estimated daily calorie needs ([place] [place]): ~1500 kcals per day with 500 kcal deficit for gradual weight loss     Recent Labs:  Hemoglobin A1C (%)   Date Value   05/17/2022 5.4   05/05/2021 5.7 (H)     Glucose (mg/dL)   Date Value   05/17/2022 90     Cholesterol (mg/dL)   Date Value   09/23/2022 240 (H)   05/17/2022 266 (H)     HDL (mg/dL)   Date Value   09/23/2022 59   05/17/2022 71     LDL (mg/dL)   Date Value   09/23/2022 163 (H)   05/17/2022 178 (H)     Triglycerides (mg/dL)   Date Value   09/23/2022 89   05/17/2022 83     No results found for: LDLDIR    Self-Monitoring BG Data  Fasting BG ranges: n/a  2 hour pp BG ranges: n/a    Nutrition Diagnosis:  Overweight/obesity related to excessive energy intake and physical inactivity as evidenced by self-reported intake and BMI > 25    Nutrition Intervention:  effects of carbohydrate, protein, and fat on blood sugars, effect of fiber on blood sugars and cholesterol, nutrition strategies for lipid management, effect of modest weight loss, if overweight, on blood sugar control, Introduction of plate method, lean protein sources, first steps for meal planning, portion control and Dietary modification for weight loss              Nutrition Counseling:  Motivational Interviewing, Goal Setting and Problem solving      Print/Written Resources Provided:   Planning Healthy Meals- Sandy, Mediterranean Diet Handout and Heart Healthy Diet Handout and High Fiber Nutrition Therapy Handout    Goal Setting:  Patient selected goal of: adding a carb food (like a banana) to 3 pm   Patient is meeting goal 75% (Usually)    Patient selected goal of: Begin modeling plate guidelines at dinner.  Patient is meeting goal 50% (Sometimes)      Patient selected goal of: Once a week go to the gym (for 30min-1 hr)   Patient is meeting goal 0 % (None of the Time)    Patient selected goal of: Drink 1 bottle of water at work.   Patient is meeting goal 50% (Sometimes)     Patient selected goal of: Find therapist and start incorporating more vegetarian meals into her diet.  Patient is meeting goal 50% (Sometimes)      Recommended Follow-up:  Follow-up appointment: 2/7/22 at 10 am   The patient was encouraged to e-mail if any questions or concerns.    Patient was seen for 60 minutes    Thank you for your referral.  Please contact me with any question/concerns.    Kelly Can MA, RD, LDN  [place] [address] [place]  P [phone]   F [phone]

## 2025-04-24 NOTE — ANESTHESIA POSTPROCEDURE EVALUATION
Department of Anesthesiology  Postprocedure Note    Patient: Last Crawley  MRN: [id number]  YOB: 1955  Date of evaluation: 4/24/2025    Procedure Summary       Date: 04/24/25 Room / Location: [address]    Anesthesia Start: 1017 Anesthesia Stop: 1044    Procedure: COLONOSCOPY POLYPECTOMY REMOVAL SNARE/STOMA Diagnosis:       Adenomatous polyp of ascending colon      (Adenomatous polyp of ascending colon [D12.2])    Surgeons: Michael Hart MD Responsible Provider: Bethel Coleman MD    Anesthesia Type: MAC ASA Status: 3            Anesthesia Type: No value filed.    Sergio Phase I: Sergio Score: 10    Sergio Phase II: Sergio Score: 10    Anesthesia Post Evaluation    Patient location during evaluation: PACU  Patient participation: complete - patient participated  Level of consciousness: awake and alert  Airway patency: patent  Nausea & Vomiting: no nausea and no vomiting  Cardiovascular status: blood pressure returned to baseline  Respiratory status: acceptable and room air  Hydration status: euvolemic  Pain management: adequate and satisfactory to patient    No notable events documented.

## 2025-04-27 LAB
EKG Q-T INTERVAL: 360 MS
EKG QRS DURATION: 80 MS
EKG QTC CALCULATION (BAZETT): 445 MS
EKG R AXIS: -4 DEGREES
EKG T AXIS: 11 DEGREES
EKG VENTRICULAR RATE: 92 BPM

## 2025-04-27 PROCEDURE — 93010 ELECTROCARDIOGRAM REPORT: CPT | Performed by: INTERNAL MEDICINE

## 2025-04-29 ENCOUNTER — RESULTS FOLLOW-UP (OUTPATIENT)
Dept: GASTROENTEROLOGY | Age: 70
End: 2025-04-29

## 2025-04-29 ENCOUNTER — PATIENT MESSAGE (OUTPATIENT)
Dept: GASTROENTEROLOGY | Age: 70
End: 2025-04-29

## 2025-04-29 LAB — SURGICAL PATHOLOGY REPORT: NORMAL

## 2025-04-29 NOTE — TELEPHONE ENCOUNTER
----- Message from Dr. Michael Hart MD sent at 4/29/2025  4:37 PM EDT -----  Polyp histology shows that it is a tubular adenoma. As per current guidelines, the patient would need repeat colonoscopy in 3 years.  Michael  ----- Message -----  From: Yvonne Muñoz Incoming Lab Results From [facility]  Sent: 4/29/2025  10:05 AM EDT  To: Michael Hart MD

## 2025-05-18 SDOH — ECONOMIC STABILITY: INCOME INSECURITY: IN THE LAST 12 MONTHS, WAS THERE A TIME WHEN YOU WERE NOT ABLE TO PAY THE MORTGAGE OR RENT ON TIME?: NO

## 2025-05-18 SDOH — ECONOMIC STABILITY: FOOD INSECURITY: WITHIN THE PAST 12 MONTHS, YOU WORRIED THAT YOUR FOOD WOULD RUN OUT BEFORE YOU GOT MONEY TO BUY MORE.: NEVER TRUE

## 2025-05-18 SDOH — HEALTH STABILITY: PHYSICAL HEALTH: ON AVERAGE, HOW MANY MINUTES DO YOU ENGAGE IN EXERCISE AT THIS LEVEL?: 60 MIN

## 2025-05-18 SDOH — ECONOMIC STABILITY: FOOD INSECURITY: WITHIN THE PAST 12 MONTHS, THE FOOD YOU BOUGHT JUST DIDN'T LAST AND YOU DIDN'T HAVE MONEY TO GET MORE.: NEVER TRUE

## 2025-05-18 SDOH — HEALTH STABILITY: PHYSICAL HEALTH: ON AVERAGE, HOW MANY DAYS PER WEEK DO YOU ENGAGE IN MODERATE TO STRENUOUS EXERCISE (LIKE A BRISK WALK)?: 4 DAYS

## 2025-05-18 SDOH — ECONOMIC STABILITY: TRANSPORTATION INSECURITY
IN THE PAST 12 MONTHS, HAS LACK OF TRANSPORTATION KEPT YOU FROM MEETINGS, WORK, OR FROM GETTING THINGS NEEDED FOR DAILY LIVING?: NO

## 2025-05-18 SDOH — ECONOMIC STABILITY: TRANSPORTATION INSECURITY
IN THE PAST 12 MONTHS, HAS THE LACK OF TRANSPORTATION KEPT YOU FROM MEDICAL APPOINTMENTS OR FROM GETTING MEDICATIONS?: NO

## 2025-05-18 ASSESSMENT — PATIENT HEALTH QUESTIONNAIRE - PHQ9
1. LITTLE INTEREST OR PLEASURE IN DOING THINGS: NOT AT ALL
SUM OF ALL RESPONSES TO PHQ QUESTIONS 1-9: 0
SUM OF ALL RESPONSES TO PHQ QUESTIONS 1-9: 0
2. FEELING DOWN, DEPRESSED OR HOPELESS: NOT AT ALL
SUM OF ALL RESPONSES TO PHQ QUESTIONS 1-9: 0
SUM OF ALL RESPONSES TO PHQ QUESTIONS 1-9: 0

## 2025-05-18 ASSESSMENT — LIFESTYLE VARIABLES
HOW OFTEN DO YOU HAVE SIX OR MORE DRINKS ON ONE OCCASION: 1
HOW MANY STANDARD DRINKS CONTAINING ALCOHOL DO YOU HAVE ON A TYPICAL DAY: 0
HOW OFTEN DO YOU HAVE A DRINK CONTAINING ALCOHOL: 1
HOW OFTEN DO YOU HAVE A DRINK CONTAINING ALCOHOL: NEVER
HOW MANY STANDARD DRINKS CONTAINING ALCOHOL DO YOU HAVE ON A TYPICAL DAY: PATIENT DOES NOT DRINK

## 2025-05-21 ENCOUNTER — OFFICE VISIT (OUTPATIENT)
Dept: PRIMARY CARE CLINIC | Age: 70
End: 2025-05-21
Payer: MEDICARE

## 2025-05-21 VITALS
DIASTOLIC BLOOD PRESSURE: 80 MMHG | BODY MASS INDEX: 39.39 KG/M2 | SYSTOLIC BLOOD PRESSURE: 118 MMHG | OXYGEN SATURATION: 98 % | WEIGHT: 251 LBS | HEART RATE: 54 BPM | HEIGHT: 67 IN

## 2025-05-21 DIAGNOSIS — D22.9 CHANGE IN SKIN MOLE: ICD-10-CM

## 2025-05-21 DIAGNOSIS — Z00.00 MEDICARE ANNUAL WELLNESS VISIT, SUBSEQUENT: Primary | ICD-10-CM

## 2025-05-21 DIAGNOSIS — L91.8 SKIN TAG: ICD-10-CM

## 2025-05-21 DIAGNOSIS — I50.22 CHRONIC SYSTOLIC HEART FAILURE (HCC): ICD-10-CM

## 2025-05-21 DIAGNOSIS — I48.91 NEW ONSET ATRIAL FIBRILLATION (HCC): ICD-10-CM

## 2025-05-21 DIAGNOSIS — L21.9 SEBORRHEIC DERMATITIS: ICD-10-CM

## 2025-05-21 PROBLEM — I10 HYPERTENSION: Status: RESOLVED | Noted: 2020-10-05 | Resolved: 2025-05-21

## 2025-05-21 PROBLEM — R19.5 POSITIVE COLORECTAL CANCER SCREENING USING COLOGUARD TEST: Status: RESOLVED | Noted: 2024-02-25 | Resolved: 2025-05-21

## 2025-05-21 PROCEDURE — G0439 PPPS, SUBSEQ VISIT: HCPCS | Performed by: NURSE PRACTITIONER

## 2025-05-21 PROCEDURE — 3017F COLORECTAL CA SCREEN DOC REV: CPT | Performed by: NURSE PRACTITIONER

## 2025-05-21 PROCEDURE — 1159F MED LIST DOCD IN RCRD: CPT | Performed by: NURSE PRACTITIONER

## 2025-05-21 PROCEDURE — 1123F ACP DISCUSS/DSCN MKR DOCD: CPT | Performed by: NURSE PRACTITIONER

## 2025-05-21 RX ORDER — KETOCONAZOLE 20 MG/ML
SHAMPOO, SUSPENSION TOPICAL
Qty: 1 EACH | Refills: 2 | Status: SHIPPED | OUTPATIENT
Start: 2025-05-21

## 2025-05-21 SDOH — ECONOMIC STABILITY: FOOD INSECURITY: WITHIN THE PAST 12 MONTHS, YOU WORRIED THAT YOUR FOOD WOULD RUN OUT BEFORE YOU GOT MONEY TO BUY MORE.: NEVER TRUE

## 2025-05-21 SDOH — ECONOMIC STABILITY: FOOD INSECURITY: WITHIN THE PAST 12 MONTHS, THE FOOD YOU BOUGHT JUST DIDN'T LAST AND YOU DIDN'T HAVE MONEY TO GET MORE.: NEVER TRUE

## 2025-05-21 ASSESSMENT — LIFESTYLE VARIABLES
HOW OFTEN DO YOU HAVE A DRINK CONTAINING ALCOHOL: NEVER
HOW MANY STANDARD DRINKS CONTAINING ALCOHOL DO YOU HAVE ON A TYPICAL DAY: PATIENT DOES NOT DRINK

## 2025-05-21 NOTE — PROGRESS NOTES
normal           Allergies   Allergen Reactions    No Known Allergies      Prior to Visit Medications    Medication Sig Taking? Authorizing Provider   ketoconazole (NIZORAL) 2 % shampoo Apply topically daily as needed. Yes Pravin Mendoza APRN - CNP   sacubitril-valsartan (ENTRESTO) 24-26 MG per tablet Take 1 tablet by mouth 2 times daily Yes Pravin Mendoza APRN - CNP   isosorbide mononitrate (IMDUR) 30 MG extended release tablet Take 1 tablet by mouth once daily Yes Geni Lopez APRN - CNP   clopidogrel (PLAVIX) 75 MG tablet Take 1 tablet by mouth once daily Yes Lindsay Rodriguez MD   nitroGLYCERIN (NITROSTAT) 0.4 MG SL tablet Place 1 tablet under the tongue every 5 minutes as needed for Chest pain up to max of 3 total doses. If no relief after 1 dose, call 911. Yes Geni Lopez APRN - CNP   rivaroxaban (XARELTO) 20 MG TABS tablet Take 1 tablet by mouth daily (with breakfast) Yes Geni Lopez APRN - CNP   simvastatin (ZOCOR) 40 MG tablet Take 1 tablet by mouth nightly Yes Lindsay Rodriguez MD   metoprolol succinate (TOPROL XL) 25 MG extended release tablet Take 1 tablet by mouth once daily Yes Lindsay Rodriguez MD   Multiple Vitamins-Minerals (THERAPEUTIC MULTIVITAMIN-MINERALS) tablet Take 1 tablet by mouth daily Yes Provider, Historical, MD       CareTeam (Including outside providers/suppliers regularly involved in providing care):   Patient Care Team:  Pravin Mendoza APRN - CNP as PCP - General (Family Nurse Practitioner)  Pravin Mendoza APRN - CNP as PCP - Empaneled Provider  Lindsay Wilson DO as Consulting Physician (Cardiology)     Recommendations for Preventive Services Due: see orders and patient instructions/AVS.  Recommended screening schedule for the next 5-10 years is provided to the patient in written form: see Patient Instructions/AVS.     Reviewed and updated this visit:  Tobacco  Allergies  Meds  Problems  Med Hx  Surg Hx  Fam Hx  Sexual   Hx      Sexual History:

## 2025-05-21 NOTE — PATIENT INSTRUCTIONS
yourself.  Watch closely for changes in your health, and be sure to contact your doctor if you have any problems.  Where can you learn more?  Go to https://www.[employer].net/patientEd and enter F075 to learn more about \"A Healthy Heart: Care Instructions.\"  Current as of: July 31, 2024  Content Version: 14.4  © 2422-7779 [employer].   Care instructions adapted under license by [employer]. If you have questions about a medical condition or this instruction, always ask your healthcare professional. [employer] disclaims any warranty or liability for your use of this information.    Personalized Preventive Plan for Last Crawlye - 5/21/2025  Medicare offers a range of preventive health benefits. Some of the tests and screenings are paid in full while other may be subject to a deductible, co-insurance, and/or copay.  Some of these benefits include a comprehensive review of your medical history including lifestyle, illnesses that may run in your family, and various assessments and screenings as appropriate.  After reviewing your medical record and screening and assessments performed today your provider may have ordered immunizations, labs, imaging, and/or referrals for you.  A list of these orders (if applicable) as well as your Preventive Care list are included within your After Visit Summary for your review.

## 2025-05-28 ENCOUNTER — RESULTS FOLLOW-UP (OUTPATIENT)
Dept: PRIMARY CARE CLINIC | Age: 70
End: 2025-05-28

## 2025-05-28 LAB
BASOPHILS ABSOLUTE: 0.04 K/UL (ref 0–0.2)
BASOPHILS RELATIVE PERCENT: 0.8 % (ref 0–2)
BUN BLDV-MCNC: 14 MG/DL (ref 8–23)
CALCIUM SERPL-MCNC: 8.8 MG/DL (ref 8.6–10.5)
CHLORIDE BLD-SCNC: 105 MMOL/L (ref 96–107)
CO2: 23 MMOL/L (ref 18–32)
CREAT SERPL-MCNC: 1.22 MG/DL (ref 0.67–1.3)
EGFR IF NONAFRICAN AMERICAN: 63 ML/MIN/1.73M2
EOSINOPHILS ABSOLUTE: 0.21 K/UL (ref 0–0.8)
EOSINOPHILS RELATIVE PERCENT: 4.2 % (ref 0–5)
GLUCOSE: 101 MG/DL (ref 70–100)
HCT VFR BLD CALC: 38.7 % (ref 39–52)
HEMOGLOBIN: 13.1 G/DL (ref 13–18)
IMMATURE GRANS (ABS): 0.01 K/UL (ref 0–0.06)
IMMATURE GRANULOCYTES %: 0.2 % (ref 0–2)
LYMPHOCYTES ABSOLUTE: 1.3 K/UL (ref 0.9–5.2)
LYMPHOCYTES RELATIVE PERCENT: 26.1 % (ref 20–45)
MCH RBC QN AUTO: 33.9 PG (ref 26–32)
MCHC RBC AUTO-ENTMCNC: 33.9 G/DL (ref 32–35)
MCV RBC AUTO: 100 FL (ref 75–100)
MONOCYTES ABSOLUTE: 0.46 K/UL (ref 0.1–1)
MONOCYTES RELATIVE PERCENT: 9.2 % (ref 0–13)
NEUTROPHILS ABSOLUTE: 2.97 K/UL (ref 1.9–8)
NEUTROPHILS RELATIVE PERCENT: 59.5 % (ref 45–75)
PDW BLD-RTO: 12.1 % (ref 11.2–14.8)
PLATELET # BLD: 172 THOUS/CMM (ref 140–440)
POTASSIUM SERPL-SCNC: 3.8 MMOL/L (ref 3.5–5.4)
RBC # BLD: 3.87 MILL/CMM (ref 4.4–6.1)
SODIUM BLD-SCNC: 142 MMOL/L (ref 135–148)
WBC # BLD: 5 THDS/CMM (ref 3.6–11)

## 2025-05-29 NOTE — RESULT ENCOUNTER NOTE
Patient read [employer] ([facility] [employer] message regarding his lab result on 5/29/25 @ 10:29am

## (undated) DEVICE — PERRYSBURG ENDO PACK: Brand: MEDLINE INDUSTRIES, INC.

## (undated) DEVICE — ADAPTER CLEANING PORPOISE CLEANING

## (undated) DEVICE — ENDO KIT W/SYRINGE: Brand: MEDLINE INDUSTRIES, INC.

## (undated) DEVICE — DEFENDO AIR WATER SUCTION AND BIOPSY VALVE KIT FOR  OLYMPUS: Brand: DEFENDO AIR/WATER/SUCTION AND BIOPSY VALVE

## (undated) DEVICE — SNARE ENDOSCP AD L240CM LOOP W10MM SHTH DIA2.4MM RND INSUL

## (undated) DEVICE — FORCEPS BX L240CM JAW DIA2.8MM L CAP W/ NDL MIC MESH TOOTH

## (undated) DEVICE — RETRIEVAL DEVICE: Brand: RESCUENET™

## (undated) DEVICE — Device: Brand: DEFENDO VALVE AND CONNECTOR KIT

## (undated) DEVICE — 4-PORT MANIFOLD: Brand: NEPTUNE 2

## (undated) DEVICE — GLOVE ORANGE PI 7 1/2   MSG9075

## (undated) DEVICE — KIT CLN UP LIN W/ STD SAHARA TBL SHT 40X60IN DRAW/LIFT SHT

## (undated) DEVICE — Device: Brand: SPOT EX ENDOSCOPIC TATTOO

## (undated) DEVICE — SNARE ENDOSCP POLYP LG 2.4 MM 240 CM 30 MM 2.8 MM CAPTIVATOR

## (undated) DEVICE — SUTURE ENDOSCOPIC LONG CINCH

## (undated) DEVICE — ERBE NESSY®PLATE 170 SPLIT; 168CM²; CABLE 3M: Brand: ERBE

## (undated) DEVICE — FORCEPS BX L240CM WRK CHN 2.8MM STD CAP W/ NDL MIC MESH

## (undated) DEVICE — NEEDLE INJ 25GA P5MM SHFT L230CM SHTH DIA2.5MM S STL TEF

## (undated) DEVICE — X-TACK ENDOSCOPIC HELIX TACKING SYSTEM (235 CM): Brand: X-TACK ENDOSCOPIC HELIX TACKING SYSTEM